# Patient Record
Sex: MALE | Race: WHITE | NOT HISPANIC OR LATINO | Employment: FULL TIME | URBAN - METROPOLITAN AREA
[De-identification: names, ages, dates, MRNs, and addresses within clinical notes are randomized per-mention and may not be internally consistent; named-entity substitution may affect disease eponyms.]

---

## 2017-01-25 ENCOUNTER — OFFICE VISIT - HEALTHEAST (OUTPATIENT)
Dept: CARDIOLOGY | Facility: CLINIC | Age: 57
End: 2017-01-25

## 2017-01-25 DIAGNOSIS — I48.0 PAROXYSMAL ATRIAL FIBRILLATION (H): ICD-10-CM

## 2017-01-25 DIAGNOSIS — G47.33 OSA ON CPAP: ICD-10-CM

## 2017-01-25 ASSESSMENT — MIFFLIN-ST. JEOR: SCORE: 1801.91

## 2017-01-27 ENCOUNTER — AMBULATORY - HEALTHEAST (OUTPATIENT)
Dept: CARDIOLOGY | Facility: CLINIC | Age: 57
End: 2017-01-27

## 2017-01-27 DIAGNOSIS — I48.0 PAROXYSMAL ATRIAL FIBRILLATION (H): ICD-10-CM

## 2017-01-27 LAB
ATRIAL RATE - MUSE: 77 BPM
DIASTOLIC BLOOD PRESSURE - MUSE: NORMAL MMHG
INTERPRETATION ECG - MUSE: NORMAL
P AXIS - MUSE: 61 DEGREES
PR INTERVAL - MUSE: 114 MS
QRS DURATION - MUSE: 80 MS
QT - MUSE: 414 MS
QTC - MUSE: 468 MS
R AXIS - MUSE: 85 DEGREES
SYSTOLIC BLOOD PRESSURE - MUSE: NORMAL MMHG
T AXIS - MUSE: 57 DEGREES
VENTRICULAR RATE- MUSE: 77 BPM

## 2017-01-27 ASSESSMENT — MIFFLIN-ST. JEOR: SCORE: 1795.56

## 2017-01-30 ENCOUNTER — COMMUNICATION - HEALTHEAST (OUTPATIENT)
Dept: SLEEP MEDICINE | Facility: CLINIC | Age: 57
End: 2017-01-30

## 2017-01-30 DIAGNOSIS — G47.00 INSOMNIA: ICD-10-CM

## 2017-01-31 ENCOUNTER — AMBULATORY - HEALTHEAST (OUTPATIENT)
Dept: CARDIOLOGY | Facility: CLINIC | Age: 57
End: 2017-01-31

## 2017-01-31 DIAGNOSIS — I48.0 PAROXYSMAL ATRIAL FIBRILLATION (H): ICD-10-CM

## 2017-01-31 LAB
ATRIAL RATE - MUSE: 52 BPM
DIASTOLIC BLOOD PRESSURE - MUSE: NORMAL MMHG
INTERPRETATION ECG - MUSE: NORMAL
P AXIS - MUSE: 45 DEGREES
PR INTERVAL - MUSE: 162 MS
QRS DURATION - MUSE: 84 MS
QT - MUSE: 454 MS
QTC - MUSE: 422 MS
R AXIS - MUSE: 81 DEGREES
SYSTOLIC BLOOD PRESSURE - MUSE: NORMAL MMHG
T AXIS - MUSE: 46 DEGREES
VENTRICULAR RATE- MUSE: 52 BPM

## 2017-01-31 ASSESSMENT — MIFFLIN-ST. JEOR: SCORE: 1813.7

## 2017-02-03 ENCOUNTER — AMBULATORY - HEALTHEAST (OUTPATIENT)
Dept: CARDIOLOGY | Facility: CLINIC | Age: 57
End: 2017-02-03

## 2017-02-03 ENCOUNTER — SURGERY - HEALTHEAST (OUTPATIENT)
Dept: CARDIOLOGY | Facility: CLINIC | Age: 57
End: 2017-02-03

## 2017-02-03 ENCOUNTER — COMMUNICATION - HEALTHEAST (OUTPATIENT)
Dept: CARDIOLOGY | Facility: CLINIC | Age: 57
End: 2017-02-03

## 2017-02-03 DIAGNOSIS — I48.0 PAROXYSMAL ATRIAL FIBRILLATION (H): ICD-10-CM

## 2017-02-20 ENCOUNTER — HOSPITAL ENCOUNTER (OUTPATIENT)
Dept: MRI IMAGING | Facility: HOSPITAL | Age: 57
Discharge: HOME | End: 2017-02-20
Attending: INTERNAL MEDICINE

## 2017-02-20 DIAGNOSIS — I48.0 PAROXYSMAL ATRIAL FIBRILLATION (H): ICD-10-CM

## 2017-03-06 ENCOUNTER — COMMUNICATION - HEALTHEAST (OUTPATIENT)
Dept: INTERNAL MEDICINE | Facility: CLINIC | Age: 57
End: 2017-03-06

## 2017-03-06 DIAGNOSIS — E78.00 PURE HYPERCHOLESTEROLEMIA: ICD-10-CM

## 2017-03-08 ENCOUNTER — RECORDS - HEALTHEAST (OUTPATIENT)
Dept: ADMINISTRATIVE | Facility: OTHER | Age: 57
End: 2017-03-08

## 2017-03-22 ENCOUNTER — OFFICE VISIT - HEALTHEAST (OUTPATIENT)
Dept: CARDIOLOGY | Facility: CLINIC | Age: 57
End: 2017-03-22

## 2017-03-22 ENCOUNTER — COMMUNICATION - HEALTHEAST (OUTPATIENT)
Dept: CARDIOLOGY | Facility: CLINIC | Age: 57
End: 2017-03-22

## 2017-03-22 ENCOUNTER — AMBULATORY - HEALTHEAST (OUTPATIENT)
Dept: CARDIOLOGY | Facility: CLINIC | Age: 57
End: 2017-03-22

## 2017-03-22 ENCOUNTER — SURGERY - HEALTHEAST (OUTPATIENT)
Dept: CARDIOLOGY | Facility: CLINIC | Age: 57
End: 2017-03-22

## 2017-03-22 DIAGNOSIS — G47.33 OSA ON CPAP: ICD-10-CM

## 2017-03-22 DIAGNOSIS — I48.0 PAROXYSMAL ATRIAL FIBRILLATION (H): ICD-10-CM

## 2017-03-22 DIAGNOSIS — I48.19 PERSISTENT ATRIAL FIBRILLATION (H): ICD-10-CM

## 2017-03-22 LAB
ATRIAL RATE - MUSE: 267 BPM
DIASTOLIC BLOOD PRESSURE - MUSE: NORMAL MMHG
INTERPRETATION ECG - MUSE: NORMAL
P AXIS - MUSE: NORMAL DEGREES
PR INTERVAL - MUSE: NORMAL MS
QRS DURATION - MUSE: 80 MS
QT - MUSE: 400 MS
QTC - MUSE: 467 MS
R AXIS - MUSE: 76 DEGREES
SYSTOLIC BLOOD PRESSURE - MUSE: NORMAL MMHG
T AXIS - MUSE: 21 DEGREES
VENTRICULAR RATE- MUSE: 82 BPM

## 2017-03-22 ASSESSMENT — MIFFLIN-ST. JEOR: SCORE: 1791.02

## 2017-04-12 ENCOUNTER — AMBULATORY - HEALTHEAST (OUTPATIENT)
Dept: CARDIOLOGY | Facility: CLINIC | Age: 57
End: 2017-04-12

## 2017-04-12 DIAGNOSIS — I48.0 PAROXYSMAL ATRIAL FIBRILLATION (H): ICD-10-CM

## 2017-04-12 DIAGNOSIS — I48.19 PERSISTENT ATRIAL FIBRILLATION (H): ICD-10-CM

## 2017-04-18 ENCOUNTER — ANESTHESIA - HEALTHEAST (OUTPATIENT)
Dept: CARDIOLOGY | Facility: CLINIC | Age: 57
End: 2017-04-18

## 2017-04-18 ASSESSMENT — MIFFLIN-ST. JEOR: SCORE: 1786.48

## 2017-04-19 ENCOUNTER — SURGERY - HEALTHEAST (OUTPATIENT)
Dept: CARDIOLOGY | Facility: CLINIC | Age: 57
End: 2017-04-19

## 2017-04-19 ASSESSMENT — MIFFLIN-ST. JEOR
SCORE: 1802.36
SCORE: 1837.74

## 2017-04-20 ASSESSMENT — MIFFLIN-ST. JEOR: SCORE: 1848.18

## 2017-04-21 ASSESSMENT — MIFFLIN-ST. JEOR: SCORE: 1841.37

## 2017-04-24 ENCOUNTER — AMBULATORY - HEALTHEAST (OUTPATIENT)
Dept: CARDIOLOGY | Facility: CLINIC | Age: 57
End: 2017-04-24

## 2017-04-24 DIAGNOSIS — Z98.890 STATUS POST CATHETER ABLATION OF ATRIAL FIBRILLATION: ICD-10-CM

## 2017-04-24 ASSESSMENT — MIFFLIN-ST. JEOR: SCORE: 1814.61

## 2017-05-08 ENCOUNTER — COMMUNICATION - HEALTHEAST (OUTPATIENT)
Dept: CARDIOLOGY | Facility: CLINIC | Age: 57
End: 2017-05-08

## 2017-05-08 ENCOUNTER — COMMUNICATION - HEALTHEAST (OUTPATIENT)
Dept: INTERNAL MEDICINE | Facility: CLINIC | Age: 57
End: 2017-05-08

## 2017-05-08 ENCOUNTER — AMBULATORY - HEALTHEAST (OUTPATIENT)
Dept: CARDIOLOGY | Facility: CLINIC | Age: 57
End: 2017-05-08

## 2017-05-08 DIAGNOSIS — I48.0 PAROXYSMAL ATRIAL FIBRILLATION (H): ICD-10-CM

## 2017-05-08 LAB
ATRIAL RATE - MUSE: 300 BPM
DIASTOLIC BLOOD PRESSURE - MUSE: NORMAL MMHG
INTERPRETATION ECG - MUSE: NORMAL
P AXIS - MUSE: 71 DEGREES
PR INTERVAL - MUSE: NORMAL MS
QRS DURATION - MUSE: 72 MS
QT - MUSE: 360 MS
QTC - MUSE: 412 MS
R AXIS - MUSE: 82 DEGREES
SYSTOLIC BLOOD PRESSURE - MUSE: NORMAL MMHG
T AXIS - MUSE: 22 DEGREES
VENTRICULAR RATE- MUSE: 79 BPM

## 2017-05-09 ENCOUNTER — AMBULATORY - HEALTHEAST (OUTPATIENT)
Dept: CARDIOLOGY | Facility: CLINIC | Age: 57
End: 2017-05-09

## 2017-05-09 ENCOUNTER — SURGERY - HEALTHEAST (OUTPATIENT)
Dept: CARDIOLOGY | Facility: CLINIC | Age: 57
End: 2017-05-09

## 2017-05-09 DIAGNOSIS — I48.92 ATRIAL FLUTTER, UNSPECIFIED TYPE (H): ICD-10-CM

## 2017-05-10 ENCOUNTER — AMBULATORY - HEALTHEAST (OUTPATIENT)
Dept: CARDIOLOGY | Facility: CLINIC | Age: 57
End: 2017-05-10

## 2017-05-10 DIAGNOSIS — I48.92 ATRIAL FLUTTER, UNSPECIFIED TYPE (H): ICD-10-CM

## 2017-05-12 ENCOUNTER — COMMUNICATION - HEALTHEAST (OUTPATIENT)
Dept: CARDIOLOGY | Facility: CLINIC | Age: 57
End: 2017-05-12

## 2017-05-17 ENCOUNTER — COMMUNICATION - HEALTHEAST (OUTPATIENT)
Dept: CARDIOLOGY | Facility: CLINIC | Age: 57
End: 2017-05-17

## 2017-05-17 ENCOUNTER — AMBULATORY - HEALTHEAST (OUTPATIENT)
Dept: CARDIOLOGY | Facility: CLINIC | Age: 57
End: 2017-05-17

## 2017-05-17 DIAGNOSIS — I48.0 PAROXYSMAL ATRIAL FIBRILLATION (H): ICD-10-CM

## 2017-05-18 ENCOUNTER — HOSPITAL ENCOUNTER (OUTPATIENT)
Dept: CARDIOLOGY | Facility: CLINIC | Age: 57
Discharge: HOME | End: 2017-05-18
Attending: INTERNAL MEDICINE | Admitting: INTERNAL MEDICINE

## 2017-05-18 ENCOUNTER — ANESTHESIA - HEALTHEAST (OUTPATIENT)
Dept: CARDIOLOGY | Facility: CLINIC | Age: 57
End: 2017-05-18

## 2017-05-18 DIAGNOSIS — I48.92 ATRIAL FLUTTER, UNSPECIFIED TYPE (H): ICD-10-CM

## 2017-05-18 LAB
ATRIAL RATE - MUSE: 288 BPM
ATRIAL RATE - MUSE: 52 BPM
DIASTOLIC BLOOD PRESSURE - MUSE: NORMAL MMHG
DIASTOLIC BLOOD PRESSURE - MUSE: NORMAL MMHG
INTERPRETATION ECG - MUSE: NORMAL
INTERPRETATION ECG - MUSE: NORMAL
P AXIS - MUSE: 69 DEGREES
P AXIS - MUSE: NORMAL DEGREES
PR INTERVAL - MUSE: 194 MS
PR INTERVAL - MUSE: NORMAL MS
QRS DURATION - MUSE: 68 MS
QRS DURATION - MUSE: 80 MS
QT - MUSE: 346 MS
QT - MUSE: 460 MS
QTC - MUSE: 427 MS
QTC - MUSE: 453 MS
R AXIS - MUSE: 75 DEGREES
R AXIS - MUSE: 77 DEGREES
SYSTOLIC BLOOD PRESSURE - MUSE: NORMAL MMHG
SYSTOLIC BLOOD PRESSURE - MUSE: NORMAL MMHG
T AXIS - MUSE: 38 DEGREES
T AXIS - MUSE: 59 DEGREES
VENTRICULAR RATE- MUSE: 103 BPM
VENTRICULAR RATE- MUSE: 52 BPM

## 2017-05-18 ASSESSMENT — MIFFLIN-ST. JEOR: SCORE: 1767.13

## 2017-05-19 ENCOUNTER — COMMUNICATION - HEALTHEAST (OUTPATIENT)
Dept: CARDIOLOGY | Facility: CLINIC | Age: 57
End: 2017-05-19

## 2017-05-31 ENCOUNTER — OFFICE VISIT - HEALTHEAST (OUTPATIENT)
Dept: CARDIOLOGY | Facility: CLINIC | Age: 57
End: 2017-05-31

## 2017-05-31 DIAGNOSIS — I48.0 PAROXYSMAL ATRIAL FIBRILLATION (H): ICD-10-CM

## 2017-05-31 DIAGNOSIS — G47.33 OSA ON CPAP: ICD-10-CM

## 2017-05-31 DIAGNOSIS — I48.3 TYPICAL ATRIAL FLUTTER (H): ICD-10-CM

## 2017-05-31 DIAGNOSIS — I48.4 ATYPICAL ATRIAL FLUTTER (H): ICD-10-CM

## 2017-05-31 ASSESSMENT — MIFFLIN-ST. JEOR: SCORE: 1768.8

## 2017-06-24 ENCOUNTER — COMMUNICATION - HEALTHEAST (OUTPATIENT)
Dept: CARDIOLOGY | Facility: CLINIC | Age: 57
End: 2017-06-24

## 2017-06-24 DIAGNOSIS — I48.0 PAROXYSMAL ATRIAL FIBRILLATION (H): ICD-10-CM

## 2017-07-13 ENCOUNTER — OFFICE VISIT - HEALTHEAST (OUTPATIENT)
Dept: CARDIOLOGY | Facility: CLINIC | Age: 57
End: 2017-07-13

## 2017-07-13 DIAGNOSIS — G47.33 OSA ON CPAP: ICD-10-CM

## 2017-07-13 DIAGNOSIS — I48.3 TYPICAL ATRIAL FLUTTER (H): ICD-10-CM

## 2017-07-13 DIAGNOSIS — I48.0 PAROXYSMAL ATRIAL FIBRILLATION (H): ICD-10-CM

## 2017-07-13 DIAGNOSIS — Z98.890 STATUS POST CATHETER ABLATION OF ATRIAL FIBRILLATION: ICD-10-CM

## 2017-07-13 DIAGNOSIS — I48.4 ATYPICAL ATRIAL FLUTTER (H): ICD-10-CM

## 2017-07-13 ASSESSMENT — MIFFLIN-ST. JEOR: SCORE: 1770.61

## 2017-07-27 ENCOUNTER — COMMUNICATION - HEALTHEAST (OUTPATIENT)
Dept: SLEEP MEDICINE | Facility: CLINIC | Age: 57
End: 2017-07-27

## 2017-07-27 DIAGNOSIS — G47.00 INSOMNIA: ICD-10-CM

## 2017-07-31 ENCOUNTER — COMMUNICATION - HEALTHEAST (OUTPATIENT)
Dept: SLEEP MEDICINE | Facility: CLINIC | Age: 57
End: 2017-07-31

## 2017-07-31 ENCOUNTER — HOSPITAL ENCOUNTER (OUTPATIENT)
Dept: CARDIOLOGY | Facility: CLINIC | Age: 57
Discharge: HOME | End: 2017-07-31
Attending: INTERNAL MEDICINE

## 2017-07-31 DIAGNOSIS — G47.00 INSOMNIA: ICD-10-CM

## 2017-07-31 DIAGNOSIS — I48.0 PAROXYSMAL ATRIAL FIBRILLATION (H): ICD-10-CM

## 2017-08-08 ENCOUNTER — OFFICE VISIT - HEALTHEAST (OUTPATIENT)
Dept: SLEEP MEDICINE | Facility: CLINIC | Age: 57
End: 2017-08-08

## 2017-08-08 DIAGNOSIS — G47.10 HYPERSOMNIA, UNSPECIFIED: ICD-10-CM

## 2017-08-08 DIAGNOSIS — G47.8 SLEEP DYSFUNCTION WITH SLEEP STAGE DISTURBANCE: ICD-10-CM

## 2017-08-08 DIAGNOSIS — G47.00 PERSISTENT INSOMNIA: ICD-10-CM

## 2017-08-08 DIAGNOSIS — F51.01 PRIMARY INSOMNIA: ICD-10-CM

## 2017-08-08 DIAGNOSIS — G47.33 OSA ON CPAP: ICD-10-CM

## 2017-08-08 ASSESSMENT — MIFFLIN-ST. JEOR: SCORE: 1775.15

## 2017-09-08 ENCOUNTER — RECORDS - HEALTHEAST (OUTPATIENT)
Dept: ADMINISTRATIVE | Facility: OTHER | Age: 57
End: 2017-09-08

## 2017-10-18 ENCOUNTER — OFFICE VISIT - HEALTHEAST (OUTPATIENT)
Dept: CARDIOLOGY | Facility: CLINIC | Age: 57
End: 2017-10-18

## 2017-10-18 DIAGNOSIS — I48.4 ATYPICAL ATRIAL FLUTTER (H): ICD-10-CM

## 2017-10-18 DIAGNOSIS — I48.3 TYPICAL ATRIAL FLUTTER (H): ICD-10-CM

## 2017-10-18 DIAGNOSIS — G47.33 OSA ON CPAP: ICD-10-CM

## 2017-10-18 DIAGNOSIS — I48.0 PAROXYSMAL ATRIAL FIBRILLATION (H): ICD-10-CM

## 2017-10-18 ASSESSMENT — MIFFLIN-ST. JEOR: SCORE: 1788.76

## 2017-12-18 ENCOUNTER — COMMUNICATION - HEALTHEAST (OUTPATIENT)
Dept: SLEEP MEDICINE | Facility: CLINIC | Age: 57
End: 2017-12-18

## 2017-12-18 ENCOUNTER — COMMUNICATION - HEALTHEAST (OUTPATIENT)
Dept: INTERNAL MEDICINE | Facility: CLINIC | Age: 57
End: 2017-12-18

## 2017-12-18 DIAGNOSIS — F51.01 PRIMARY INSOMNIA: ICD-10-CM

## 2017-12-18 DIAGNOSIS — E78.00 PURE HYPERCHOLESTEROLEMIA: ICD-10-CM

## 2018-02-19 ENCOUNTER — COMMUNICATION - HEALTHEAST (OUTPATIENT)
Dept: INTERNAL MEDICINE | Facility: CLINIC | Age: 58
End: 2018-02-19

## 2018-03-06 ENCOUNTER — RECORDS - HEALTHEAST (OUTPATIENT)
Dept: ADMINISTRATIVE | Facility: OTHER | Age: 58
End: 2018-03-06

## 2018-03-19 ENCOUNTER — OFFICE VISIT - HEALTHEAST (OUTPATIENT)
Dept: INTERNAL MEDICINE | Facility: CLINIC | Age: 58
End: 2018-03-19

## 2018-03-19 DIAGNOSIS — I48.0 PAROXYSMAL ATRIAL FIBRILLATION (H): ICD-10-CM

## 2018-03-19 DIAGNOSIS — G47.33 OSA ON CPAP: ICD-10-CM

## 2018-03-19 DIAGNOSIS — H61.22 IMPACTED CERUMEN OF LEFT EAR: ICD-10-CM

## 2018-03-19 DIAGNOSIS — C85.80 MARGINAL ZONE LYMPHOMA (H): ICD-10-CM

## 2018-03-19 DIAGNOSIS — R45.1 RESTLESSNESS: ICD-10-CM

## 2018-03-19 DIAGNOSIS — H91.92 HEARING REDUCED, LEFT: ICD-10-CM

## 2018-03-19 DIAGNOSIS — Z87.19 HISTORY OF COLITIS: ICD-10-CM

## 2018-03-19 DIAGNOSIS — Z51.81 MEDICATION MONITORING ENCOUNTER: ICD-10-CM

## 2018-03-19 DIAGNOSIS — E78.00 PURE HYPERCHOLESTEROLEMIA: ICD-10-CM

## 2018-03-19 ASSESSMENT — MIFFLIN-ST. JEOR: SCORE: 1820.51

## 2018-04-09 ENCOUNTER — OFFICE VISIT - HEALTHEAST (OUTPATIENT)
Dept: OTOLARYNGOLOGY | Facility: CLINIC | Age: 58
End: 2018-04-09

## 2018-04-09 DIAGNOSIS — H61.22 IMPACTED CERUMEN OF LEFT EAR: ICD-10-CM

## 2018-04-09 DIAGNOSIS — H91.92 HEARING REDUCED, LEFT: ICD-10-CM

## 2018-05-19 ENCOUNTER — COMMUNICATION - HEALTHEAST (OUTPATIENT)
Dept: SLEEP MEDICINE | Facility: CLINIC | Age: 58
End: 2018-05-19

## 2018-05-19 DIAGNOSIS — F51.01 PRIMARY INSOMNIA: ICD-10-CM

## 2018-05-21 ENCOUNTER — COMMUNICATION - HEALTHEAST (OUTPATIENT)
Dept: INTERNAL MEDICINE | Facility: CLINIC | Age: 58
End: 2018-05-21

## 2018-05-30 ENCOUNTER — OFFICE VISIT - HEALTHEAST (OUTPATIENT)
Dept: CARDIOLOGY | Facility: CLINIC | Age: 58
End: 2018-05-30

## 2018-05-30 DIAGNOSIS — I48.0 PAROXYSMAL ATRIAL FIBRILLATION (H): ICD-10-CM

## 2018-05-30 DIAGNOSIS — G47.33 OSA ON CPAP: ICD-10-CM

## 2018-05-30 DIAGNOSIS — I48.4 ATYPICAL ATRIAL FLUTTER (H): ICD-10-CM

## 2018-05-30 DIAGNOSIS — I48.3 TYPICAL ATRIAL FLUTTER (H): ICD-10-CM

## 2018-05-30 ASSESSMENT — MIFFLIN-ST. JEOR: SCORE: 1815.97

## 2018-08-13 ENCOUNTER — COMMUNICATION - HEALTHEAST (OUTPATIENT)
Dept: SCHEDULING | Facility: CLINIC | Age: 58
End: 2018-08-13

## 2018-08-14 ENCOUNTER — OFFICE VISIT - HEALTHEAST (OUTPATIENT)
Dept: INTERNAL MEDICINE | Facility: CLINIC | Age: 58
End: 2018-08-14

## 2018-08-14 DIAGNOSIS — K52.9 COLITIS: ICD-10-CM

## 2018-08-14 DIAGNOSIS — R10.13 ABDOMINAL PAIN, EPIGASTRIC: ICD-10-CM

## 2018-08-14 DIAGNOSIS — C85.80 MARGINAL ZONE LYMPHOMA (H): ICD-10-CM

## 2018-08-14 LAB
ALBUMIN SERPL-MCNC: 3.8 G/DL (ref 3.5–5)
ALBUMIN UR-MCNC: NEGATIVE MG/DL
ALP SERPL-CCNC: 69 U/L (ref 45–120)
ALT SERPL W P-5'-P-CCNC: 29 U/L (ref 0–45)
ANION GAP SERPL CALCULATED.3IONS-SCNC: 10 MMOL/L (ref 5–18)
APPEARANCE UR: CLEAR
AST SERPL W P-5'-P-CCNC: 21 U/L (ref 0–40)
BACTERIA #/AREA URNS HPF: ABNORMAL HPF
BILIRUB SERPL-MCNC: 0.5 MG/DL (ref 0–1)
BILIRUB UR QL STRIP: NEGATIVE
BUN SERPL-MCNC: 20 MG/DL (ref 8–22)
CALCIUM SERPL-MCNC: 9.4 MG/DL (ref 8.5–10.5)
CHLORIDE BLD-SCNC: 103 MMOL/L (ref 98–107)
CO2 SERPL-SCNC: 27 MMOL/L (ref 22–31)
COLOR UR AUTO: YELLOW
CREAT SERPL-MCNC: 0.85 MG/DL (ref 0.7–1.3)
ERYTHROCYTE [DISTWIDTH] IN BLOOD BY AUTOMATED COUNT: 12.3 % (ref 11–14.5)
GFR SERPL CREATININE-BSD FRML MDRD: >60 ML/MIN/1.73M2
GLUCOSE BLD-MCNC: 87 MG/DL (ref 70–125)
GLUCOSE UR STRIP-MCNC: NEGATIVE MG/DL
HCT VFR BLD AUTO: 47.2 % (ref 40–54)
HGB BLD-MCNC: 15.8 G/DL (ref 14–18)
HGB UR QL STRIP: ABNORMAL
KETONES UR STRIP-MCNC: NEGATIVE MG/DL
LEUKOCYTE ESTERASE UR QL STRIP: NEGATIVE
LIPASE SERPL-CCNC: 23 U/L (ref 0–52)
MCH RBC QN AUTO: 32.1 PG (ref 27–34)
MCHC RBC AUTO-ENTMCNC: 33.4 G/DL (ref 32–36)
MCV RBC AUTO: 96 FL (ref 80–100)
MUCOUS THREADS #/AREA URNS LPF: ABNORMAL LPF
NITRATE UR QL: NEGATIVE
PH UR STRIP: 6 [PH] (ref 5–8)
PLATELET # BLD AUTO: 237 THOU/UL (ref 140–440)
PMV BLD AUTO: 8.3 FL (ref 7–10)
POTASSIUM BLD-SCNC: 3.9 MMOL/L (ref 3.5–5)
PROT SERPL-MCNC: 7 G/DL (ref 6–8)
RBC # BLD AUTO: 4.92 MILL/UL (ref 4.4–6.2)
RBC #/AREA URNS AUTO: ABNORMAL HPF
SODIUM SERPL-SCNC: 140 MMOL/L (ref 136–145)
SP GR UR STRIP: 1.02 (ref 1–1.03)
SQUAMOUS #/AREA URNS AUTO: ABNORMAL LPF
UROBILINOGEN UR STRIP-ACNC: ABNORMAL
WBC #/AREA URNS AUTO: ABNORMAL HPF
WBC: 14.2 THOU/UL (ref 4–11)

## 2018-08-14 ASSESSMENT — MIFFLIN-ST. JEOR: SCORE: 1815.97

## 2018-08-15 ENCOUNTER — COMMUNICATION - HEALTHEAST (OUTPATIENT)
Dept: INTERNAL MEDICINE | Facility: CLINIC | Age: 58
End: 2018-08-15

## 2018-12-10 ENCOUNTER — OFFICE VISIT - HEALTHEAST (OUTPATIENT)
Dept: INTERNAL MEDICINE | Facility: CLINIC | Age: 58
End: 2018-12-10

## 2018-12-10 DIAGNOSIS — Z00.00 HEALTHCARE MAINTENANCE: ICD-10-CM

## 2018-12-10 DIAGNOSIS — E78.00 PURE HYPERCHOLESTEROLEMIA: ICD-10-CM

## 2018-12-10 DIAGNOSIS — C85.80 MARGINAL ZONE LYMPHOMA (H): ICD-10-CM

## 2018-12-10 DIAGNOSIS — I48.0 PAROXYSMAL ATRIAL FIBRILLATION (H): ICD-10-CM

## 2018-12-10 DIAGNOSIS — Z87.19 HISTORY OF COLITIS: ICD-10-CM

## 2018-12-10 DIAGNOSIS — G47.33 OSA ON CPAP: ICD-10-CM

## 2018-12-10 DIAGNOSIS — Z12.5 SCREENING FOR PROSTATE CANCER: ICD-10-CM

## 2018-12-10 DIAGNOSIS — Z51.81 MEDICATION MONITORING ENCOUNTER: ICD-10-CM

## 2018-12-10 LAB
ALBUMIN SERPL-MCNC: 4.1 G/DL (ref 3.5–5)
ALP SERPL-CCNC: 72 U/L (ref 45–120)
ALT SERPL W P-5'-P-CCNC: 33 U/L (ref 0–45)
ANION GAP SERPL CALCULATED.3IONS-SCNC: 11 MMOL/L (ref 5–18)
AST SERPL W P-5'-P-CCNC: 28 U/L (ref 0–40)
BILIRUB SERPL-MCNC: 0.4 MG/DL (ref 0–1)
BUN SERPL-MCNC: 22 MG/DL (ref 8–22)
CALCIUM SERPL-MCNC: 9.6 MG/DL (ref 8.5–10.5)
CHLORIDE BLD-SCNC: 102 MMOL/L (ref 98–107)
CO2 SERPL-SCNC: 29 MMOL/L (ref 22–31)
CREAT SERPL-MCNC: 0.87 MG/DL (ref 0.7–1.3)
FASTING STATUS PATIENT QL REPORTED: YES
GFR SERPL CREATININE-BSD FRML MDRD: >60 ML/MIN/1.73M2
GLUCOSE BLD-MCNC: 78 MG/DL (ref 70–125)
HDLC SERPL-MCNC: 41 MG/DL
LDLC SERPL CALC-MCNC: 96 MG/DL
POTASSIUM BLD-SCNC: 4.3 MMOL/L (ref 3.5–5)
PROT SERPL-MCNC: 7.2 G/DL (ref 6–8)
PSA SERPL-MCNC: 1.4 NG/ML (ref 0–3.5)
SODIUM SERPL-SCNC: 142 MMOL/L (ref 136–145)

## 2018-12-10 ASSESSMENT — MIFFLIN-ST. JEOR: SCORE: 1810.98

## 2018-12-11 ENCOUNTER — COMMUNICATION - HEALTHEAST (OUTPATIENT)
Dept: INTERNAL MEDICINE | Facility: CLINIC | Age: 58
End: 2018-12-11

## 2019-01-28 ENCOUNTER — COMMUNICATION - HEALTHEAST (OUTPATIENT)
Dept: SLEEP MEDICINE | Facility: CLINIC | Age: 59
End: 2019-01-28

## 2019-01-28 DIAGNOSIS — G47.00 PERSISTENT INSOMNIA: ICD-10-CM

## 2019-01-28 DIAGNOSIS — F51.01 PRIMARY INSOMNIA: ICD-10-CM

## 2019-03-19 ENCOUNTER — RECORDS - HEALTHEAST (OUTPATIENT)
Dept: ADMINISTRATIVE | Facility: OTHER | Age: 59
End: 2019-03-19

## 2019-04-02 ENCOUNTER — OFFICE VISIT - HEALTHEAST (OUTPATIENT)
Dept: SLEEP MEDICINE | Facility: CLINIC | Age: 59
End: 2019-04-02

## 2019-04-02 DIAGNOSIS — G47.33 OBSTRUCTIVE SLEEP APNEA: ICD-10-CM

## 2019-04-02 DIAGNOSIS — F51.01 PRIMARY INSOMNIA: ICD-10-CM

## 2019-04-02 DIAGNOSIS — G47.8 SLEEP DYSFUNCTION WITH SLEEP STAGE DISTURBANCE: ICD-10-CM

## 2019-04-02 ASSESSMENT — MIFFLIN-ST. JEOR: SCORE: 1815.97

## 2019-04-04 ENCOUNTER — RECORDS - HEALTHEAST (OUTPATIENT)
Dept: ADMINISTRATIVE | Facility: OTHER | Age: 59
End: 2019-04-04

## 2019-04-04 ENCOUNTER — COMMUNICATION - HEALTHEAST (OUTPATIENT)
Dept: INTERNAL MEDICINE | Facility: CLINIC | Age: 59
End: 2019-04-04

## 2019-04-04 DIAGNOSIS — E78.00 PURE HYPERCHOLESTEROLEMIA: ICD-10-CM

## 2019-04-04 DIAGNOSIS — Z87.19 HISTORY OF COLITIS: ICD-10-CM

## 2019-06-22 ENCOUNTER — COMMUNICATION - HEALTHEAST (OUTPATIENT)
Dept: INTERNAL MEDICINE | Facility: CLINIC | Age: 59
End: 2019-06-22

## 2019-06-22 DIAGNOSIS — E78.00 PURE HYPERCHOLESTEROLEMIA: ICD-10-CM

## 2019-06-25 ENCOUNTER — COMMUNICATION - HEALTHEAST (OUTPATIENT)
Dept: SLEEP MEDICINE | Facility: CLINIC | Age: 59
End: 2019-06-25

## 2019-06-25 DIAGNOSIS — G47.10 HYPERSOMNIA, UNSPECIFIED: ICD-10-CM

## 2019-06-25 DIAGNOSIS — G47.33 OBSTRUCTIVE SLEEP APNEA: ICD-10-CM

## 2019-08-19 ENCOUNTER — COMMUNICATION - HEALTHEAST (OUTPATIENT)
Dept: SLEEP MEDICINE | Facility: CLINIC | Age: 59
End: 2019-08-19

## 2019-08-19 DIAGNOSIS — F51.01 PRIMARY INSOMNIA: ICD-10-CM

## 2019-08-21 ENCOUNTER — COMMUNICATION - HEALTHEAST (OUTPATIENT)
Dept: INTERNAL MEDICINE | Facility: CLINIC | Age: 59
End: 2019-08-21

## 2019-09-10 ENCOUNTER — COMMUNICATION - HEALTHEAST (OUTPATIENT)
Dept: SLEEP MEDICINE | Facility: CLINIC | Age: 59
End: 2019-09-10

## 2019-09-26 ENCOUNTER — COMMUNICATION - HEALTHEAST (OUTPATIENT)
Dept: INTERNAL MEDICINE | Facility: CLINIC | Age: 59
End: 2019-09-26

## 2019-09-26 DIAGNOSIS — Z87.19 HISTORY OF COLITIS: ICD-10-CM

## 2019-10-10 ENCOUNTER — COMMUNICATION - HEALTHEAST (OUTPATIENT)
Dept: CARDIOLOGY | Facility: CLINIC | Age: 59
End: 2019-10-10

## 2019-10-14 ENCOUNTER — AMBULATORY - HEALTHEAST (OUTPATIENT)
Dept: CARDIOLOGY | Facility: CLINIC | Age: 59
End: 2019-10-14

## 2019-10-14 DIAGNOSIS — I48.0 PAROXYSMAL ATRIAL FIBRILLATION (H): ICD-10-CM

## 2019-10-14 DIAGNOSIS — Z00.6 EXAM FOR CLINICAL TRIAL: ICD-10-CM

## 2019-10-14 DIAGNOSIS — I48.3 TYPICAL ATRIAL FLUTTER (H): ICD-10-CM

## 2019-10-14 DIAGNOSIS — I48.4 ATYPICAL ATRIAL FLUTTER (H): ICD-10-CM

## 2019-10-14 LAB
ATRIAL RATE - MUSE: 72 BPM
DIASTOLIC BLOOD PRESSURE - MUSE: NORMAL
INTERPRETATION ECG - MUSE: NORMAL
P AXIS - MUSE: 67 DEGREES
PR INTERVAL - MUSE: 190 MS
QRS DURATION - MUSE: 84 MS
QT - MUSE: 390 MS
QTC - MUSE: 427 MS
R AXIS - MUSE: 90 DEGREES
SYSTOLIC BLOOD PRESSURE - MUSE: NORMAL
T AXIS - MUSE: 57 DEGREES
VENTRICULAR RATE- MUSE: 72 BPM

## 2019-10-14 ASSESSMENT — MIFFLIN-ST. JEOR: SCORE: 1809.15

## 2019-10-17 ENCOUNTER — AMBULATORY - HEALTHEAST (OUTPATIENT)
Dept: CARDIOLOGY | Facility: CLINIC | Age: 59
End: 2019-10-17

## 2019-12-04 ENCOUNTER — OFFICE VISIT - HEALTHEAST (OUTPATIENT)
Dept: CARDIOLOGY | Facility: CLINIC | Age: 59
End: 2019-12-04

## 2019-12-04 DIAGNOSIS — I48.3 TYPICAL ATRIAL FLUTTER (H): ICD-10-CM

## 2019-12-04 DIAGNOSIS — Z98.890 STATUS POST CATHETER ABLATION OF ATRIAL FIBRILLATION: ICD-10-CM

## 2019-12-04 DIAGNOSIS — I48.0 PAROXYSMAL ATRIAL FIBRILLATION (H): ICD-10-CM

## 2019-12-04 ASSESSMENT — MIFFLIN-ST. JEOR: SCORE: 1793.29

## 2020-02-07 ENCOUNTER — COMMUNICATION - HEALTHEAST (OUTPATIENT)
Dept: INTERNAL MEDICINE | Facility: CLINIC | Age: 60
End: 2020-02-07

## 2020-02-07 DIAGNOSIS — E78.00 PURE HYPERCHOLESTEROLEMIA: ICD-10-CM

## 2020-02-07 DIAGNOSIS — Z87.19 HISTORY OF COLITIS: ICD-10-CM

## 2020-02-25 ENCOUNTER — RECORDS - HEALTHEAST (OUTPATIENT)
Dept: ADMINISTRATIVE | Facility: OTHER | Age: 60
End: 2020-02-25

## 2020-05-26 ENCOUNTER — COMMUNICATION - HEALTHEAST (OUTPATIENT)
Dept: SLEEP MEDICINE | Facility: CLINIC | Age: 60
End: 2020-05-26

## 2020-05-26 DIAGNOSIS — F51.01 PRIMARY INSOMNIA: ICD-10-CM

## 2020-05-29 ENCOUNTER — MEDICAL CORRESPONDENCE (OUTPATIENT)
Dept: HEALTH INFORMATION MANAGEMENT | Facility: CLINIC | Age: 60
End: 2020-05-29

## 2020-07-01 ENCOUNTER — OFFICE VISIT - HEALTHEAST (OUTPATIENT)
Dept: OTOLARYNGOLOGY | Facility: CLINIC | Age: 60
End: 2020-07-01

## 2020-07-01 DIAGNOSIS — H61.23 BILATERAL IMPACTED CERUMEN: ICD-10-CM

## 2020-07-02 ENCOUNTER — OFFICE VISIT - HEALTHEAST (OUTPATIENT)
Dept: INTERNAL MEDICINE | Facility: CLINIC | Age: 60
End: 2020-07-02

## 2020-07-02 ENCOUNTER — COMMUNICATION - HEALTHEAST (OUTPATIENT)
Dept: SCHEDULING | Facility: CLINIC | Age: 60
End: 2020-07-02

## 2020-07-02 ENCOUNTER — COMMUNICATION - HEALTHEAST (OUTPATIENT)
Dept: INTERNAL MEDICINE | Facility: CLINIC | Age: 60
End: 2020-07-02

## 2020-07-02 DIAGNOSIS — Z98.890 STATUS POST CATHETER ABLATION OF ATRIAL FIBRILLATION: ICD-10-CM

## 2020-07-02 DIAGNOSIS — M54.12 CERVICAL RADICULOPATHY: ICD-10-CM

## 2020-07-02 DIAGNOSIS — I48.0 PAROXYSMAL ATRIAL FIBRILLATION (H): ICD-10-CM

## 2020-12-14 ENCOUNTER — COMMUNICATION - HEALTHEAST (OUTPATIENT)
Dept: SLEEP MEDICINE | Facility: CLINIC | Age: 60
End: 2020-12-14

## 2020-12-14 DIAGNOSIS — F51.01 PRIMARY INSOMNIA: ICD-10-CM

## 2020-12-14 RX ORDER — ZOLPIDEM TARTRATE 10 MG/1
10 TABLET ORAL
COMMUNITY
End: 2022-01-06

## 2020-12-14 RX ORDER — ZOLPIDEM TARTRATE 10 MG/1
10 TABLET ORAL
OUTPATIENT
Start: 2020-12-14

## 2021-02-09 ENCOUNTER — RECORDS - HEALTHEAST (OUTPATIENT)
Dept: ADMINISTRATIVE | Facility: OTHER | Age: 61
End: 2021-02-09

## 2021-02-17 ENCOUNTER — COMMUNICATION - HEALTHEAST (OUTPATIENT)
Dept: CARDIOLOGY | Facility: CLINIC | Age: 61
End: 2021-02-17

## 2021-02-18 ENCOUNTER — OFFICE VISIT - HEALTHEAST (OUTPATIENT)
Dept: CARDIOLOGY | Facility: CLINIC | Age: 61
End: 2021-02-18

## 2021-02-18 DIAGNOSIS — I48.4 ATYPICAL ATRIAL FLUTTER (H): ICD-10-CM

## 2021-02-18 DIAGNOSIS — I48.19 PERSISTENT ATRIAL FIBRILLATION (H): ICD-10-CM

## 2021-02-18 DIAGNOSIS — E78.00 PURE HYPERCHOLESTEROLEMIA: ICD-10-CM

## 2021-02-18 DIAGNOSIS — I48.3 TYPICAL ATRIAL FLUTTER (H): ICD-10-CM

## 2021-02-18 DIAGNOSIS — G47.33 OSA ON CPAP: ICD-10-CM

## 2021-02-18 ASSESSMENT — MIFFLIN-ST. JEOR: SCORE: 1711.65

## 2021-03-11 ENCOUNTER — COMMUNICATION - HEALTHEAST (OUTPATIENT)
Dept: INTERNAL MEDICINE | Facility: CLINIC | Age: 61
End: 2021-03-11

## 2021-03-11 DIAGNOSIS — Z87.19 HISTORY OF COLITIS: ICD-10-CM

## 2021-03-11 DIAGNOSIS — E78.00 PURE HYPERCHOLESTEROLEMIA: ICD-10-CM

## 2021-05-27 NOTE — TELEPHONE ENCOUNTER
Refill Approved    Rx renewed per Medication Renewal Policy. Medication was last renewed on 3/19/18.    Jelena Cobos, [facility] Triage/Med Refill 4/5/2019     Requested Prescriptions   Pending Prescriptions Disp Refills     simvastatin (ZOCOR) 20 MG tablet [Pharmacy Med Name: SIMVASTATIN 20MG TABLET** 20 TAB] 90 tablet 3     Sig: TAKE 1 TABLET BY MOUTH EACH NIGHT AT BEDTIME    Statins Refill Protocol (Hmg CoA Reductase Inhibitors) Passed - 4/4/2019  9:54 AM       Passed - PCP or prescribing provider visit in past 12 months     Last office visit with prescriber/PCP: 3/19/2018 Ryan Hernandez MD OR same dept: Visit date not found OR same specialty: 8/14/2018 Hossein Rosario MD  Last physical: 12/10/2018 Last MTM visit: Visit date not found   Next visit within 3 mo: Visit date not found  Next physical within 3 mo: Visit date not found  Prescriber OR PCP: Ryan Hernandez MD  Last diagnosis associated with med order: 1. Pure hypercholesterolemia  - simvastatin (ZOCOR) 20 MG tablet [Pharmacy Med Name: SIMVASTATIN 20MG TABLET** 20 TAB]; TAKE 1 TABLET BY MOUTH EACH NIGHT AT BEDTIME  Dispense: 90 tablet; Refill: 3    2. History of colitis  - DELZICOL 400 mg delayed-release capsule [Pharmacy Med Name: DELZICOL  MG CAPSULE 400 CAP]; TAKE 2 CAPSULES (800 MG TOTAL) BY MOUTH DAILY.  Dispense: 180 capsule; Refill: 3    If protocol passes may refill for 12 months if within 3 months of last provider visit (or a total of 15 months).             DELZICOL 400 mg delayed-release capsule [Pharmacy Med Name: DELZICOL  MG CAPSULE 400 CAP] 180 capsule 3     Sig: TAKE 2 CAPSULES (800 MG TOTAL) BY MOUTH DAILY.    Mesalamine Refill Protocol for Crohns Passed - 4/4/2019  9:54 AM       Passed - LFT or AST or ALT in last year    Albumin   Date Value Ref Range Status   12/10/2018 4.1 3.5 - 5.0 g/dL Final     Bilirubin, Total   Date Value Ref Range Status   12/10/2018 0.4 0.0 - 1.0 mg/dL Final     Alkaline Phosphatase   Date Value  Ref Range Status   12/10/2018 72 45 - 120 U/L Final     AST   Date Value Ref Range Status   12/10/2018 28 0 - 40 U/L Final     ALT   Date Value Ref Range Status   12/10/2018 33 0 - 45 U/L Final     Protein, Total   Date Value Ref Range Status   12/10/2018 7.2 6.0 - 8.0 g/dL Final               Passed - Visit with PCP or prescribing provider visit in last 6 months or next 3 months    Last office visit with prescriber/PCP: Visit date not found OR same dept: Visit date not found OR same specialty: 8/14/2018 Hossein Rosario MD Last physical: 12/10/2018 Last MTM visit: Visit date not found     Next appt within 3 mo: Visit date not found  Next physical within 3 mo: Visit date not found  Prescriber OR PCP: Ryan Hernandez MD  Last diagnosis associated with med order: 1. Pure hypercholesterolemia  - simvastatin (ZOCOR) 20 MG tablet [Pharmacy Med Name: SIMVASTATIN 20MG TABLET** 20 TAB]; TAKE 1 TABLET BY MOUTH EACH NIGHT AT BEDTIME  Dispense: 90 tablet; Refill: 3    2. History of colitis  - DELZICOL 400 mg delayed-release capsule [Pharmacy Med Name: DELZICOL  MG CAPSULE 400 CAP]; TAKE 2 CAPSULES (800 MG TOTAL) BY MOUTH DAILY.  Dispense: 180 capsule; Refill: 3    If protocol passes may refill for 6 months if within 3 months of last provider visit (or a total of 9 months).             Passed - CBC (Hemogram 2) in last year     WBC   Date Value Ref Range Status   08/14/2018 14.2 (H) 4.0 - 11.0 thou/uL Final     RBC   Date Value Ref Range Status   08/14/2018 4.92 4.40 - 6.20 mill/uL Final     Hemoglobin   Date Value Ref Range Status   08/14/2018 15.8 14.0 - 18.0 g/dL Final     Hematocrit   Date Value Ref Range Status   08/14/2018 47.2 40.0 - 54.0 % Final     MCV   Date Value Ref Range Status   08/14/2018 96 80 - 100 fL Final     MCH   Date Value Ref Range Status   08/14/2018 32.1 27.0 - 34.0 pg Final     MCHC   Date Value Ref Range Status   08/14/2018 33.4 32.0 - 36.0 g/dL Final     RDW   Date Value Ref Range Status    08/14/2018 12.3 11.0 - 14.5 % Final     Platelets   Date Value Ref Range Status   08/14/2018 237 140 - 440 thou/uL Final     MPV   Date Value Ref Range Status   08/14/2018 8.3 7.0 - 10.0 fL Final               Passed - Serum Creatinine in the last year    Creatinine   Date Value Ref Range Status   12/10/2018 0.87 0.70 - 1.30 mg/dL Final

## 2021-05-27 NOTE — PROGRESS NOTES
Dear Dr. Hernandez, Ryan MATTSON MD  [address] [place] [address],    Thank you for the opportunity to participate in the care of Rik Zarate.     He is a 58 y.o. y/o male patient who comes to the sleep medicine clinic for follow up.  The patient states that he still doing well on CPAP therapy.  He still occasionally requires the Ambien to help him initiate sleep.  He does complaint of frequent nocturnal awakenings but this may be secondary to the dog sleeping in the bedroom.  He has discussed this with his wife about removing the dog in the past.    Compliance Download data for 30 days:  Pressure setting: APAP 8-12 CWP  Residual AHI: 0.2 events per hour  Leak: Minimal  Compliance: 97%  Mask Tolerance: Good  Skin irritation: None      Past Medical History:   Diagnosis Date     Atrial fibrillation (H)      Cancer (H)     marginal zone non hodgkins lymphoma     Colitis      High cholesterol      JESSIE on CPAP        Past Surgical History:   Procedure Laterality Date     CARDIOVERSION  05/18/2017     OTHER SURGICAL HISTORY  04/19/2017    atrial fib ablation     MS EPHYS EVAL W/ABLATION SUPRAVENT ARRHYTHMIA  4/19/2017    Procedure: EP Ablation Atrial Flutter;  Surgeon: Jones Leonardo MD;  Location: [facility];  Service: Cardiology     MS EPHYS EVL TRNSPTL TX ATRIAL FIB ISOLAT PULM VEIN Left 4/19/2017    Procedure: EP Ablation PVI;  Surgeon: Jones Leonardo MD;  Location: [facility];  Service: Cardiology       Social History     Socioeconomic History     Marital status:      Spouse name: Not on file     Number of children: Not on file     Years of education: Not on file     Highest education level: Not on file   Occupational History     Occupation: manager   Social Needs     Financial resource strain: Not on file     Food insecurity:     Worry: Not on file     Inability: Not on file     Transportation needs:     Medical: Not on file     Non-medical: Not on file   Tobacco Use     Smoking  status: Former Smoker     Last attempt to quit: 1990     Years since quittin.2     Smokeless tobacco: Former User     Tobacco comment: occasional cigar smoker   Substance and Sexual Activity     Alcohol use: Yes     Alcohol/week: 2.4 oz     Types: 1 Cans of beer, 3 Shots of liquor per week     Comment: weekly     Drug use: No     Comment: Occasional use.     Sexual activity: Not on file   Lifestyle     Physical activity:     Days per week: Not on file     Minutes per session: Not on file     Stress: Not on file   Relationships     Social connections:     Talks on phone: Not on file     Gets together: Not on file     Attends Church service: Not on file     Active member of club or organization: Not on file     Attends meetings of clubs or organizations: Not on file     Relationship status: Not on file     Intimate partner violence:     Fear of current or ex partner: Not on file     Emotionally abused: Not on file     Physically abused: Not on file     Forced sexual activity: Not on file   Other Topics Concern     Not on file   Social History Narrative     Not on file       Current Outpatient Medications   Medication Sig Dispense Refill     aspirin 81 MG EC tablet Take 1 tablet (81 mg total) by mouth daily.       CALCIUM CITRATE/VITAMIN D3 (CITRACAL REGULAR ORAL) Take 1 tablet by mouth daily.       coQ10, ubiquinol, 100 mg cap Take 1 capsule by mouth daily.       loratadine (CLARITIN) 10 mg tablet Take 10 mg by mouth daily.       mesalamine (DELZICOL) 400 mg delayed-release capsule Take 2 capsules (800 mg total) by mouth daily. 180 capsule 3     multivitamin capsule Take 1 capsule by mouth daily.       OMEGA-3/DHA/EPA/FISH OIL (FISH OIL-OMEGA-3 FATTY ACIDS) 300-1,000 mg capsule Take 2 g by mouth daily.       omeprazole (PRILOSEC) 20 MG capsule Take 1 capsule (20 mg total) by mouth daily. 30 capsule 0     simvastatin (ZOCOR) 20 MG tablet TAKE 1 TABLET BY MOUTH EACH NIGHT AT BEDTIME 90 tablet 3     zolpidem  "(AMBIEN) 10 mg tablet Take 0.5 tablets (5 mg total) by mouth at bedtime as needed for sleep. 15 tablet 0     No current facility-administered medications for this visit.        No Known Allergies    Epworths Sleepiness Scale 3/4/2016 6/22/2016 4/2/2019   Sitting and reading 1 1 1   Watching TV 1 1 2   Sitting, inactive in a public place (e.g. a theatre or a meeting) 2 1 3   As a passenger in a car for an hour without a break 2 1 0   Lying down to rest in the afternoon when circumstances permit 2 1 3   Sitting and talking to someone 0 0 0   Sitting quietly after a lunch without alcohol 1 1 2   In a car, while stopped for a few minutes in traffic 0 0 0   Total score 9 6 11       Physical Exam:  /58   Pulse 73   Ht 5' 11\" (1.803 m)   Wt 218 lb (98.9 kg)   SpO2 95%   BMI 30.40 kg/m    BMI:Body mass index is 30.4 kg/m .   GEN: NAD, obese  Psych: normal mood, normal affect    Labs/Studies:     I reviewed the efficacy and compliance report from his device. Data summarized on the HPI and the CPAP compliance flow sheet.     Lab Results   Component Value Date    WBC 14.2 (H) 08/14/2018    HGB 15.8 08/14/2018    HCT 47.2 08/14/2018    MCV 96 08/14/2018     08/14/2018         Chemistry        Component Value Date/Time     12/10/2018 1448    K 4.3 12/10/2018 1448     12/10/2018 1448    CO2 29 12/10/2018 1448    BUN 22 12/10/2018 1448    CREATININE 0.87 12/10/2018 1448    GLU 78 12/10/2018 1448        Component Value Date/Time    CALCIUM 9.6 12/10/2018 1448    ALKPHOS 72 12/10/2018 1448    AST 28 12/10/2018 1448    ALT 33 12/10/2018 1448    BILITOT 0.4 12/10/2018 1448            No results found for: FERRITIN         Assessment and Plan:  In summary Rik Zarate is a 58 y.o. year old male who is here for follow-up.    1.  Obstructive sleep apnea on CPAP  I congratulated patient on his excellent CPAP usage.  I will keep him in the same pressure settings for now.  2.  Insomnia  I will renew the " patient's prescription for Ambien.  3.  Other sleep disturbance     Patient verbalized understanding of these issues, agrees with the plan and all questions were answered today. Patient was given an opportuntity to voice any other symptoms or concerns not listed above. Patient did not have any other symptoms or concerns.      Patient told to return in 12 months.    Mahesh Keller DO  Board Certified in Internal Medicine and Sleep Medicine  [facility].    more than 50% of the encounter was used for counseling or coordination of care.    (Note created with Dragon voice recognition and unintended spelling errors and word substitutions may occur)

## 2021-05-29 ENCOUNTER — RECORDS - HEALTHEAST (OUTPATIENT)
Dept: ADMINISTRATIVE | Facility: CLINIC | Age: 61
End: 2021-05-29

## 2021-05-29 NOTE — TELEPHONE ENCOUNTER
Refill Approved    Rx renewed per Medication Renewal Policy. Medication was last renewed on 4/5/19.    Jelena Cobos, [facility] Triage/Med Refill 6/24/2019     Requested Prescriptions   Pending Prescriptions Disp Refills     simvastatin (ZOCOR) 20 MG tablet [Pharmacy Med Name: SIMVASTATIN 20MG TABLET** 20 TAB] 90 tablet 1     Sig: TAKE 1 TABLET BY MOUTH EACH NIGHT AT BEDTIME       Statins Refill Protocol (Hmg CoA Reductase Inhibitors) Passed - 6/22/2019  2:08 PM        Passed - PCP or prescribing provider visit in past 12 months      Last office visit with prescriber/PCP: 3/19/2018 Ryan Hernandez MD OR same dept: Visit date not found OR same specialty: 8/14/2018 Hossein Rosario MD  Last physical: 12/10/2018 Last MTM visit: Visit date not found   Next visit within 3 mo: Visit date not found  Next physical within 3 mo: Visit date not found  Prescriber OR PCP: Ryan Hernandez MD  Last diagnosis associated with med order: 1. Pure hypercholesterolemia  - simvastatin (ZOCOR) 20 MG tablet [Pharmacy Med Name: SIMVASTATIN 20MG TABLET** 20 TAB]; TAKE 1 TABLET BY MOUTH EACH NIGHT AT BEDTIME  Dispense: 90 tablet; Refill: 1    If protocol passes may refill for 12 months if within 3 months of last provider visit (or a total of 15 months).

## 2021-05-30 ENCOUNTER — HEALTH MAINTENANCE LETTER (OUTPATIENT)
Age: 61
End: 2021-05-30

## 2021-05-30 VITALS — BODY MASS INDEX: 30.48 KG/M2 | WEIGHT: 217.7 LBS | HEIGHT: 71 IN

## 2021-05-30 VITALS — BODY MASS INDEX: 31.3 KG/M2 | WEIGHT: 223.6 LBS | HEIGHT: 71 IN

## 2021-05-30 VITALS — HEIGHT: 70 IN | BODY MASS INDEX: 31.07 KG/M2 | WEIGHT: 217 LBS

## 2021-05-30 VITALS — HEIGHT: 70 IN | BODY MASS INDEX: 30.92 KG/M2 | WEIGHT: 216 LBS

## 2021-05-30 VITALS — BODY MASS INDEX: 31.26 KG/M2 | WEIGHT: 218.4 LBS | HEIGHT: 70 IN

## 2021-05-30 VITALS — HEIGHT: 70 IN | WEIGHT: 221 LBS | BODY MASS INDEX: 31.64 KG/M2

## 2021-05-30 NOTE — TELEPHONE ENCOUNTER
"Please fax this patient's CPAP Rx to the DME below:  DME: [facility] ( Please put \"Att: Kenyetta)  Need New Supplies Or A New Machine?   Previous DME (If Applicable; If Switching):    Thank you.    "

## 2021-05-30 NOTE — TELEPHONE ENCOUNTER
Please fax this patient's CPAP Rx to the DME below:  DME: [facility]  Need New Supplies Or A New Machine? New supplies  Previous DME (If Applicable; If Switching):    Thank you.

## 2021-05-30 NOTE — TELEPHONE ENCOUNTER
Order for Durable Medical Equipment was processed and equipment ordered.     DME Company: [facility]    Date: 7/1/2019    Ordering Provider: Dr. Keller    Equipment Ordered: CPAP Supplies    Fax Number: [facility]: [phone]    Gosia Nelson CMA 7/1/2019 2:59 PM

## 2021-05-30 NOTE — TELEPHONE ENCOUNTER
Needing clarification on below. Called the patient at [phone], no answer, left a brief message for the patient to call back. When patient returns call, please clarify if he is requesting supplies, or a new device.     Gosia Nelson CMA 6/26/2019 9:34 AM

## 2021-05-31 VITALS — HEIGHT: 71 IN | BODY MASS INDEX: 29.26 KG/M2 | WEIGHT: 209 LBS

## 2021-05-31 VITALS — BODY MASS INDEX: 29.01 KG/M2 | HEIGHT: 71 IN | WEIGHT: 207.23 LBS

## 2021-05-31 VITALS — WEIGHT: 212 LBS | BODY MASS INDEX: 29.68 KG/M2 | HEIGHT: 71 IN

## 2021-05-31 VITALS — BODY MASS INDEX: 29.12 KG/M2 | HEIGHT: 71 IN | WEIGHT: 208 LBS

## 2021-05-31 VITALS — BODY MASS INDEX: 29.06 KG/M2 | HEIGHT: 71 IN | WEIGHT: 207.6 LBS

## 2021-06-01 VITALS — HEIGHT: 71 IN | WEIGHT: 219 LBS | BODY MASS INDEX: 30.66 KG/M2

## 2021-06-01 VITALS — WEIGHT: 218 LBS | HEIGHT: 71 IN | BODY MASS INDEX: 30.52 KG/M2

## 2021-06-01 VITALS — HEIGHT: 71 IN | WEIGHT: 218 LBS | BODY MASS INDEX: 30.52 KG/M2

## 2021-06-01 NOTE — TELEPHONE ENCOUNTER
RN cannot approve Refill Request    RN can NOT refill this medication PCP messaged that patient is overdue for Labs and Office Visit. Last office visit: 3/19/2018 Ryan Hernandez MD Last Physical: 12/10/2018 Last MTM visit: Visit date not found Last visit same specialty: 8/14/2018 Hossein Rosario MD.  Next visit within 3 mo: Visit date not found  Next physical within 3 mo: Visit date not found      Gina Mojica, [facility] Triage/Med Refill 9/27/2019    Requested Prescriptions   Pending Prescriptions Disp Refills     mesalamine (DELZICOL) 400 mg delayed-release capsule [Pharmacy Med Name: MESALAMINE 400 MG CPDR 400 CAP] 180 capsule 1     Sig: TAKE 2 CAPSULES (800 MG TOTAL) BY MOUTH DAILY.       Mesalamine Refill Protocol for Crohns Failed - 9/26/2019  5:00 PM        Failed - Visit with PCP or prescribing provider visit in last 6 months or next 3 months     Last office visit with prescriber/PCP: Visit date not found OR same dept: Visit date not found OR same specialty: 8/14/2018 Hossein Rosario MD Last physical: Visit date not found Last MTM visit: Visit date not found     Next appt within 3 mo: Visit date not found  Next physical within 3 mo: Visit date not found  Prescriber OR PCP: Ryan Hernandez MD  Last diagnosis associated with med order: 1. History of colitis  - mesalamine (DELZICOL) 400 mg delayed-release capsule [Pharmacy Med Name: MESALAMINE 400 MG CPDR 400 CAP]; TAKE 2 CAPSULES (800 MG TOTAL) BY MOUTH DAILY.  Dispense: 180 capsule; Refill: 1    If protocol passes may refill for 6 months if within 3 months of last provider visit (or a total of 9 months).              Failed - CBC (Hemogram 2) in last year      WBC   Date Value Ref Range Status   08/14/2018 14.2 (H) 4.0 - 11.0 thou/uL Final     RBC   Date Value Ref Range Status   08/14/2018 4.92 4.40 - 6.20 mill/uL Final     Hemoglobin   Date Value Ref Range Status   08/14/2018 15.8 14.0 - 18.0 g/dL Final     Hematocrit   Date Value Ref Range Status    08/14/2018 47.2 40.0 - 54.0 % Final     MCV   Date Value Ref Range Status   08/14/2018 96 80 - 100 fL Final     MCH   Date Value Ref Range Status   08/14/2018 32.1 27.0 - 34.0 pg Final     MCHC   Date Value Ref Range Status   08/14/2018 33.4 32.0 - 36.0 g/dL Final     RDW   Date Value Ref Range Status   08/14/2018 12.3 11.0 - 14.5 % Final     Platelets   Date Value Ref Range Status   08/14/2018 237 140 - 440 thou/uL Final     MPV   Date Value Ref Range Status   08/14/2018 8.3 7.0 - 10.0 fL Final                Passed - LFT or AST or ALT in last year     Albumin   Date Value Ref Range Status   12/10/2018 4.1 3.5 - 5.0 g/dL Final     Bilirubin, Total   Date Value Ref Range Status   12/10/2018 0.4 0.0 - 1.0 mg/dL Final     Alkaline Phosphatase   Date Value Ref Range Status   12/10/2018 72 45 - 120 U/L Final     AST   Date Value Ref Range Status   12/10/2018 28 0 - 40 U/L Final     ALT   Date Value Ref Range Status   12/10/2018 33 0 - 45 U/L Final     Protein, Total   Date Value Ref Range Status   12/10/2018 7.2 6.0 - 8.0 g/dL Final                Passed - Serum Creatinine in the last year     Creatinine   Date Value Ref Range Status   12/10/2018 0.87 0.70 - 1.30 mg/dL Final

## 2021-06-01 NOTE — TELEPHONE ENCOUNTER
This was completed. Edith actually called me for clarification, due to order stating [facility] as DME

## 2021-06-02 VITALS — BODY MASS INDEX: 30.36 KG/M2 | HEIGHT: 71 IN | WEIGHT: 216.9 LBS

## 2021-06-02 VITALS — WEIGHT: 218 LBS | HEIGHT: 71 IN | BODY MASS INDEX: 30.52 KG/M2

## 2021-06-02 NOTE — TELEPHONE ENCOUNTER
Zestar Study Consent Visit    Study description: ECG and PPG Study: Zestar Study      Rik Zarate a 58 y.o. male , was contacted by today to discuss participation in the Zestar study.     The patient called the Clinical Trials Office to inquire about study participation.  The consent form was reviewed with the patient.     The review of the study included:    Study purpose     Conflict of interest    Device description      Study visits    Risks of participation    Benefits (if any)    Alternatives    Voluntary participation    Confidentiality     Compensation/costs of participation    Study stipends    Injury and legal rights    The subject was queried in regards to his willingness to continue and come in for scheduled appointment. his questions were answered to his satisfaction.   The patient has given his preliminary agreement to volunteer to participate in the above noted study.     Plan: Rik Zarate will come to [facility] on 10/14/2019 to continue consent process. If he continues to agrees to participate, the study visit will be done on the same day.    Rosalinda Scott RN

## 2021-06-03 VITALS
HEIGHT: 70 IN | TEMPERATURE: 97.5 F | DIASTOLIC BLOOD PRESSURE: 87 MMHG | RESPIRATION RATE: 17 BRPM | SYSTOLIC BLOOD PRESSURE: 144 MMHG | BODY MASS INDEX: 31.65 KG/M2 | WEIGHT: 221.1 LBS | HEART RATE: 70 BPM

## 2021-06-03 NOTE — PROGRESS NOTES
[facility] HEART [facility]  Arrhythmia Clinic  Jones Leonardo    Referring:      Assessment:         Paroxysmal atrial fibrillation: The patient is approximately 1 year out from his ablation procedure to treat his atrial fibrillation.  He is had no symptomatic recurrence of atrial fibrillation or flutter.  He has had no ECG documented recurrence of atrial fibrillation or flutter.  Patient is off membrane active and rhythmic drug therapy.  The patient is off oral anticoagulant therapy as he has a low [id number] score.  He remains on aspirin therapy.    Hypertension: Patient's blood pressure was recently noted to be elevated.  The patient reports that he also reviewed his diet which is been quite high in sodium recently.  He is cut his sodium intake back dramatically and his blood pressure today is normal.    Obstructive sleep apnea: The patient is compliant with his CPAP therapy and recently was seen back in the sleep clinic.      Recommendations:    No change in current medical therapy.    Follow-up in the A. fib clinic in 1 year.      Patient Active Problem List   Diagnosis     Hypercholesterolemia     Actinic Keratosis     Shoulder Tendonitis     Cough     Non-Hodgkin's Lymphoma     Abdominal Pain     Paroxysmal atrial fibrillation (H)     JESSIE on CPAP     Marginal zone lymphoma (H)     Status post catheter ablation of atrial fibrillation     Atypical atrial flutter (H)     Typical atrial flutter (H)     History of colitis       Subjective:  Rik Zarate (59 y.o. male) returns to the arrhythmia clinic for interval reevaluation of his atrial fibrillation post ablation.  The patient is about 1 year out from his ablation procedure to treat his atrial fibrillation.  He continues to do well with no symptomatic recurrence of atrial fibrillation symptoms i.e. palpitations.  He does check his pulse with a phone Shadi and has had no documented recurrence of atrial fibrillation.  No exertional dyspnea or chest pain.  He  reports no orthopnea, PND or ankle edema.  He is compliant with his CPAP therapy.    Current Outpatient Medications   Medication Sig Dispense Refill     aspirin 81 MG EC tablet Take 1 tablet (81 mg total) by mouth daily. (Patient taking differently: Take 162 mg by mouth daily.       )       CALCIUM CITRATE/VITAMIN D3 (CITRACAL REGULAR ORAL) Take 1 tablet by mouth daily.       coQ10, ubiquinol, 100 mg cap Take 1 capsule by mouth daily.       loratadine (CLARITIN) 10 mg tablet Take 10 mg by mouth daily.       mesalamine (DELZICOL) 400 mg delayed-release capsule TAKE 2 CAPSULES (800 MG TOTAL) BY MOUTH DAILY. 180 capsule 1     multivitamin capsule Take 1 capsule by mouth daily.       NON FORMULARY Take 1 capsule by mouth daily.       OMEGA-3/DHA/EPA/FISH OIL (FISH OIL-OMEGA-3 FATTY ACIDS) 300-1,000 mg capsule Take 2 g by mouth daily.       omeprazole (PRILOSEC) 20 MG capsule Take 1 capsule (20 mg total) by mouth daily. 30 capsule 0     simvastatin (ZOCOR) 20 MG tablet TAKE 1 TABLET BY MOUTH EACH NIGHT AT BEDTIME 90 tablet 1     zolpidem (AMBIEN) 10 mg tablet TAKE 0.5 TABLETS (5 MG TOTAL) BY MOUTH AT BEDTIME AS NEEDED FOR SLEEP. 15 tablet 4     No current facility-administered medications for this visit.        Review of Systems:                                            Family History  Family History   Problem Relation Age of Onset     Snoring Father      Leukemia Father      Lung cancer Mother      No Medical Problems Sister      Prostate cancer Brother      No Medical Problems Brother      Kidney disease Sister      No Medical Problems Sister        Social History   reports that he quit smoking about 29 years ago. He has quit using smokeless tobacco. He reports current alcohol use of about 4.0 standard drinks of alcohol per week. He reports that he does not use drugs.    Objective:   Vital signs in last 24 hours:  /66 (Patient Site: Right Arm, Patient Position: Sitting, Cuff Size: Adult Regular)   Pulse 74    "Resp 16   Ht 5' 9\" (1.753 m)   Wt 220 lb (99.8 kg)   BMI 32.49 kg/m    Weight: Weight: 220 lb (99.8 kg)     Physical Exam:  General: The patient is alert oriented to person place and situation.  The patient is in no acute distress at the time of my evaluation.  Eyes: Pupils are equal, round, and reactive to light.  Conjunctiva and sclera are clear.  ENT: Oral mucosa is moist and without redness. No evident nasal discharge.  Pulmonary: Lungs are clear bilaterally with no rales, rhonchi, or wheezes.    Cardiovascular exam: Rhythm is regular. S1 and S2 are normal. No significant murmur is present. JVP is normal. Lower extremities demonstrate no significant edema. Distal pulses are intact bilaterally.  Abdomen is obese, soft, and nontender.  Musculoskeletal: Spine is straight. Extremities without deformity.  Neuro: Gait is normal.   Skin is warm, dry, and otherwise intact.      Cardiographics:       Lab Results:   Lab Results   Component Value Date     12/10/2018    K 4.3 12/10/2018     12/10/2018    CO2 29 12/10/2018    BUN 22 12/10/2018    CREATININE 0.87 12/10/2018    CALCIUM 9.6 12/10/2018     Lab Results   Component Value Date    WBC 14.2 (H) 08/14/2018    HGB 15.8 08/14/2018    HCT 47.2 08/14/2018    MCV 96 08/14/2018     08/14/2018     No results found for: INR      Outside record review:        "

## 2021-06-04 VITALS
WEIGHT: 220 LBS | RESPIRATION RATE: 16 BRPM | HEIGHT: 69 IN | HEART RATE: 74 BPM | SYSTOLIC BLOOD PRESSURE: 128 MMHG | DIASTOLIC BLOOD PRESSURE: 66 MMHG | BODY MASS INDEX: 32.58 KG/M2

## 2021-06-05 VITALS
RESPIRATION RATE: 16 BRPM | HEART RATE: 62 BPM | HEIGHT: 69 IN | BODY MASS INDEX: 29.92 KG/M2 | OXYGEN SATURATION: 97 % | DIASTOLIC BLOOD PRESSURE: 64 MMHG | SYSTOLIC BLOOD PRESSURE: 120 MMHG | WEIGHT: 202 LBS

## 2021-06-05 NOTE — TELEPHONE ENCOUNTER
RN cannot approve Refill Request    RN can NOT refill this medication Protocol failed and NO refill given.      Jelena Cobos, [facility] Triage/Med Refill 2/8/2020    Requested Prescriptions   Pending Prescriptions Disp Refills     mesalamine (DELZICOL) 400 mg delayed-release capsule [Pharmacy Med Name: MESALAMINE 400 MG CPDR 400 CAP] 180 capsule 1     Sig: TAKE 2 CAPSULES (800 MG TOTAL) BY MOUTH DAILY.       Mesalamine Refill Protocol for Crohns Failed - 2/7/2020  6:52 PM        Failed - LFT or AST or ALT in last year     Albumin   Date Value Ref Range Status   12/10/2018 4.1 3.5 - 5.0 g/dL Final     Bilirubin, Total   Date Value Ref Range Status   12/10/2018 0.4 0.0 - 1.0 mg/dL Final     Alkaline Phosphatase   Date Value Ref Range Status   12/10/2018 72 45 - 120 U/L Final     AST   Date Value Ref Range Status   12/10/2018 28 0 - 40 U/L Final     ALT   Date Value Ref Range Status   12/10/2018 33 0 - 45 U/L Final     Protein, Total   Date Value Ref Range Status   12/10/2018 7.2 6.0 - 8.0 g/dL Final                Failed - Visit with PCP or prescribing provider visit in last 6 months or next 3 months     Last office visit with prescriber/PCP: Visit date not found OR same dept: Visit date not found OR same specialty: 8/14/2018 Hossein Rosario MD Last physical: Visit date not found Last MTM visit: Visit date not found     Next appt within 3 mo: Visit date not found  Next physical within 3 mo: Visit date not found  Prescriber OR PCP: Ryan Hernandez MD  Last diagnosis associated with med order: 1. History of colitis  - mesalamine (DELZICOL) 400 mg delayed-release capsule [Pharmacy Med Name: MESALAMINE 400 MG CPDR 400 CAP]; TAKE 2 CAPSULES (800 MG TOTAL) BY MOUTH DAILY.  Dispense: 180 capsule; Refill: 1    2. Pure hypercholesterolemia  - simvastatin (ZOCOR) 20 MG tablet [Pharmacy Med Name: SIMVASTATIN 20MG TABLET** 20 TAB]; TAKE 1 TABLET BY MOUTH EACH NIGHT AT BEDTIME  Dispense: 90 tablet; Refill: 1    If protocol  passes may refill for 6 months if within 3 months of last provider visit (or a total of 9 months).              Failed - CBC (Hemogram 2) in last year      WBC   Date Value Ref Range Status   08/14/2018 14.2 (H) 4.0 - 11.0 thou/uL Final     RBC   Date Value Ref Range Status   08/14/2018 4.92 4.40 - 6.20 mill/uL Final     Hemoglobin   Date Value Ref Range Status   08/14/2018 15.8 14.0 - 18.0 g/dL Final     Hematocrit   Date Value Ref Range Status   08/14/2018 47.2 40.0 - 54.0 % Final     MCV   Date Value Ref Range Status   08/14/2018 96 80 - 100 fL Final     MCH   Date Value Ref Range Status   08/14/2018 32.1 27.0 - 34.0 pg Final     MCHC   Date Value Ref Range Status   08/14/2018 33.4 32.0 - 36.0 g/dL Final     RDW   Date Value Ref Range Status   08/14/2018 12.3 11.0 - 14.5 % Final     Platelets   Date Value Ref Range Status   08/14/2018 237 140 - 440 thou/uL Final     MPV   Date Value Ref Range Status   08/14/2018 8.3 7.0 - 10.0 fL Final                Failed - Serum Creatinine in the last year     Creatinine   Date Value Ref Range Status   12/10/2018 0.87 0.70 - 1.30 mg/dL Final             simvastatin (ZOCOR) 20 MG tablet [Pharmacy Med Name: SIMVASTATIN 20MG TABLET** 20 TAB] 90 tablet 1     Sig: TAKE 1 TABLET BY MOUTH EACH NIGHT AT BEDTIME       Statins Refill Protocol (Hmg CoA Reductase Inhibitors) Failed - 2/7/2020  6:52 PM        Failed - PCP or prescribing provider visit in past 12 months      Last office visit with prescriber/PCP: 3/19/2018 Ryan Hernandez MD OR same dept: Visit date not found OR same specialty: 8/14/2018 Hossein Rosario MD  Last physical: 12/10/2018 Last MTM visit: Visit date not found   Next visit within 3 mo: Visit date not found  Next physical within 3 mo: Visit date not found  Prescriber OR PCP: Ryan Hernandez MD  Last diagnosis associated with med order: 1. History of colitis  - mesalamine (DELZICOL) 400 mg delayed-release capsule [Pharmacy Med Name: MESALAMINE 400 MG CPDR 400 CAP];  TAKE 2 CAPSULES (800 MG TOTAL) BY MOUTH DAILY.  Dispense: 180 capsule; Refill: 1    2. Pure hypercholesterolemia  - simvastatin (ZOCOR) 20 MG tablet [Pharmacy Med Name: SIMVASTATIN 20MG TABLET** 20 TAB]; TAKE 1 TABLET BY MOUTH EACH NIGHT AT BEDTIME  Dispense: 90 tablet; Refill: 1    If protocol passes may refill for 12 months if within 3 months of last provider visit (or a total of 15 months).

## 2021-06-08 NOTE — PROGRESS NOTES
[facility]  Arrhythmia Clinic  Jones Leonardo    Referring:      Assessment:         Paroxysmal atrial fibrillation: The patient clearly has symptomatic atrial fibrillation.  This has continued despite use of membrane active antiarrhythmic drug therapy (flecainide).  Over the short-term the patient is a candidate for a change of medical therapy.  I have suggested a trial of sotalol therapy to see if this provides him any better control particularly as he plans on taking a couple of ischemic patient's over the next 6 weeks.  The patient is a good long-term candidate for ablation of his atrial fibrillation.  The underlying pathophysiology with the condition, mapping, and ablation strategies, as well as risks, benefits, and expected outcomes were all discussed in detail.  The patient will consider this option further.    Obstructive sleep apnea: The patient is compliant with CPAP therapy      Recommendations:     The patient's flecainide and metoprolol will be stopped at this time.    Sotalol 80 mg twice a day.  12-lead EKG will be performed on Friday.    If the patient's symptoms are improved on sotalol then we can leave him on that medication or consider up titration to 120 mg twice a day.     If the patient's symptoms are not improved on sotalol I would suggest switching back to flecainide and increasing his dose to 100 mg twice daily in combination with metoprolol.    Follow-up in the EP clinic with the EP nurse practitioner in 3 months.      Patient Active Problem List   Diagnosis     Hypercholesterolemia     Actinic Keratosis     Shoulder Tendonitis     Cough     Non-Hodgkin's Lymphoma     Abdominal Pain     Paroxysmal atrial fibrillation     JESSIE on CPAP     Marginal zone lymphoma       Subjective:  Rik Zarate (56 y.o. male) returns to the arrhythmia clinic for evaluation of atrial fibrillation management options.  The patient notes approximately 18 months of symptoms.  His atrial  "fibrillation was documented and diagnosed in 2016.  Initially the patient's symptoms occurred at night with a sensation of \"heart racing\".  Since that time the patient was placed on beta blocker therapy and he developed some mild shortness of breath and lightheadedness.  More recently the patient was started on flecainide about 4 weeks ago.  The patientcontinued to have recurrence of atrial fibrillation lasting anywhere from minutes up to 2 hours.  This is despite cutting back on caffeinated liquids in trying to manage his stress.  When the patient is out of rhythm and he tries to exercise his heart rate will quickly accelerated from the 90s up into the 130s to 150 beat per minute range.  He is not any presyncope or syncope.    Current Outpatient Prescriptions   Medication Sig Dispense Refill     aspirin 325 MG tablet Take 325 mg by mouth daily.       CALCIUM CITRATE/VITAMIN D3 (CITRACAL REGULAR ORAL) Take 1 tablet by mouth daily.       cholecalciferol, vitamin D3, (VITAMIN D3) 2,000 unit cap Take 1-2 tablets by mouth daily.        coQ10, ubiquinol, 100 mg cap Take 1 capsule by mouth daily.       mesalamine delayed-release (DELZICOL) 400 mg CpDR capsule Take 2 capsules (800 mg total) by mouth daily. 90 capsule 5     multivitamin capsule Take 1 capsule by mouth daily.       OMEGA-3/DHA/EPA/FISH OIL (FISH OIL-OMEGA-3 FATTY ACIDS) 300-1,000 mg capsule Take 2 g by mouth daily.       simvastatin (ZOCOR) 20 MG tablet TAKE 1 TABLET (20 MG TOTAL) BY MOUTH DAILY. 90 tablet 0     vitamin E 400 UNIT capsule Take 400 Units by mouth daily.       zolpidem (AMBIEN) 10 mg tablet as needed.   0     DELZICOL 400 mg delayed-release capsule Take 400 mg by mouth.  1     sotalol (BETAPACE) 80 MG tablet Take 1 tablet (80 mg total) by mouth 2 (two) times a day. 60 tablet 3     No current facility-administered medications for this visit.        Review of Systems:                                            Family History  Family History " "  Problem Relation Age of Onset     Snoring Father        Social History   reports that he has quit smoking. He does not have any smokeless tobacco history on file. He reports that he drinks about 6.0 - 7.2 oz of alcohol per week     Objective:   Vital signs in last 24 hours:  Visit Vitals     /60 (Patient Site: Left Arm, Patient Position: Sitting, Cuff Size: Adult Large)     Pulse (!) 54     Ht 5' 10\" (1.778 m)     Wt 218 lb 6.4 oz (99.1 kg)     BMI 31.34 kg/m2     Weight: Weight: 218 lb 6.4 oz (99.1 kg)     Physical Exam:  General: The patient is alert oriented to person place and situation.  The patient is in no acute distress at the time of my evaluation.  Eyes: Pupils are equal, round, and reactive to light.  Conjunctiva and sclera are clear.  ENT: Oral mucosa is moist and without redness. No evident nasal discharge.  Pulmonary: Lungs are clear bilaterally with no rales, rhonchi, or wheezes.    Cardiovascular exam: Rhythm is regular. S1 and S2 are normal. No significant murmur is present. JVP is normal. Lower extremities demonstrate no significant edema. Distal pulses are intact bilaterally.  Abdomen is flat, soft, and nontender.  Musculoskeletal: Spine is straight. Extremities without deformity.  Neuro: Gait is normal.   Skin is warm, dry, and otherwise intact.      Cardiographics:   EKGs echo and stress test have been reviewed.    Lab Results:   Lab Results   Component Value Date     11/16/2016    K 3.8 11/16/2016     11/16/2016    CO2 29 11/16/2016    BUN 25 (H) 11/16/2016    CREATININE 0.98 11/16/2016    CALCIUM 8.6 11/16/2016     Lab Results   Component Value Date    WBC 15.7 (H) 11/16/2016    HGB 15.8 11/16/2016    HCT 47.8 11/16/2016    MCV 97 11/16/2016     11/16/2016     No results found for: INR      Outside record review:        "

## 2021-06-08 NOTE — PROGRESS NOTES
Pt comes in to clinic today for an ekg after initiation of Sotalol 80 mg BID per Dr. Leonardo visit on 1-25-17.  EKG shows afib @77, , QTc 430.  Pt states that his initial dose of Sotalol made him feel very woozy, lightheaded and nauseated for about 15 minutes but that did resolve and he has taken a total of 3 doses and has not had any similar symptoms.  He was not able to start until Thursday am due to his pharmacy.  He states that he felt that he was in afib all day yesterday but felt that he was in SR today and is surprised to hear that he is still in afib.  He feels really good today, is able to do normal activities and is pleased with Sotalol at this point, though he is in Afib.  Reviewed Dr. Leonardo's note with the patient.  He is reluctant to increase dose or switch back to a higher dose of Flecainide at this point.  He would like to give it a few more days and see how he feels over the weekend.  QTc wnl but has only had 3 doses.  Suggested the patient come back to the clinic early next week for a repeat ekg.  At that point we can determine his QT and rhythm, also his symptoms with the medication.  He states agreement.  Reviewed contact information and he will call with questions or concerns.

## 2021-06-08 NOTE — PROGRESS NOTES
1960  [phone] (home)    +++Important patient information for CSC/Cath Lab staff : None+++    [facility] EP Cath Lab Procedure Order   Ablation Type:  PVI- Atrial Fibrillation  Specific location of pathway: Left    Diagnosis:  AF  Anticipated Case Duration:  4  Scheduling Needs/Timeframe:  Will need to start anticoagulant 4 wks prior- schedule accordingly    MD Preference: Dr Jorden MCKEE Assist:  No Specific MD:  N/A    Current Device: None None  Device Company/Device Rep Needed for Procedure: None    Pre-Procedural Testing needed: HEIDY and MRI  Mapping System Required:  MARGO  ICE Needed:  Yes  Anesthesia:  General-Whole Case  Research Protocol:  No    [facility] EP Cath Lab Prep   Ordering Provider: Dr Leonardo  Ordering Date: 2/3/2017  Orders Status: Intial order placed and Order set placed  EP NC Contact: Flores Moseley RN    H&P:  EP NP to complete within 1 wk prior to scheduled PVI  PCP: Ryan Hernandez MD, [phone]    Pre-op Labs: Done within 7 days    Medical Records Pertinent for Procedure:  stress test 12-29-16 negative, EF 65%, Holter 1-29-16 SR, Echo 2-10-16 EF 60% and EKG 1-31-17 SB @52,  1-27-17 Afib @77, 2-2-16 Afib @96    Patient Education:    Teach with Patient: Teach with pt completed same day as pre-op H&P-see additional clinic note    Risks Reviewed:     Pulmonary Vein/AF/Radiofrequency Ablation  In addition to standard risks for Radiofrequency Ablation, there is:    <2% for significant pulmonary vein stenosis    <2% risk for embolic events    <1% risk for esophageal fistula    <1% risk death    Pulmonary Vein Isolation / Cryoablation Risks:    1-2% risk for phrenic nerve paralysis    <1% risk for pulmonary vein stenosis    Risk of esophageal irritation with no incidence of atrial-esophageal fistula    Rare cryoballoon rupture  <1% risk death       Cardiac Catheter Ablation    <1% risk for the following: hypotension, hemorrhage, vascular injury including perforation of vein, artery or heart,  thrombophlebitis, systemic or pulmonic emboli; cardiac perforation, (tamponade), infection, pneumothorax, arrhythmias, proarrhythmic effects of drugs, radiation exposure.    1-2% complete heart block (for AVNRT or septol accessory pathway).    <0.5% CVA or MI    <0.1% death    If external defibrillation is needed, 75% risk for superficial burn.    1-2% tamponade and aortic puncture with left sided transeptal approach for left side MARGO - increase risk of CVA to <2%.  Late arrhythmia recurrences depends upon the primary rhythm disturbance.    Consent: Obtained in clinic by NC prior to procedure    Pre-Procedure Instructions that were Reviewed with Patient:  NPO after midnight, Notified patient of time and date of procedure by CV , Transportation arrangements needed s/p procedure, Post-procedure follow up process, Sedation plan/orders and Pre-procedure letter was sent to pt by CV     Pre-Procedure Medication Instructions:  Instructions given to pt regarding anticoagulants: Pt will be started on anticoagulant 1mo prior to PVI- instructed to continue anticoagulation uninterrupted through their procedure , start Eliquis one month prior per Jan  Instructions given to pt regarding antiarrhythmic medication: Sotalol; Pt instructed to hold 3 days prior to procedure  Instructions for medication, other than anticoagulants/antiarrhythmics listed above, given to pt: to take all morning medications with small sips of water, with the exception of OTC supplements and MVI am of HEIDY;  Hold all am medications with the exception of anticoagulation am of PVI    No Known Allergies    Current Outpatient Prescriptions:      aspirin 325 MG tablet, Take 325 mg by mouth daily., Disp: , Rfl:      CALCIUM CITRATE/VITAMIN D3 (CITRACAL REGULAR ORAL), Take 1 tablet by mouth daily., Disp: , Rfl:      cholecalciferol, vitamin D3, (VITAMIN D3) 2,000 unit cap, Take 1-2 tablets by mouth daily. , Disp: , Rfl:      coQ10, ubiquinol, 100  mg cap, Take 1 capsule by mouth daily., Disp: , Rfl:      DELZICOL 400 mg delayed-release capsule, Take 400 mg by mouth., Disp: , Rfl: 1     mesalamine delayed-release (DELZICOL) 400 mg CpDR capsule, Take 2 capsules (800 mg total) by mouth daily., Disp: 90 capsule, Rfl: 5     multivitamin capsule, Take 1 capsule by mouth daily., Disp: , Rfl:      OMEGA-3/DHA/EPA/FISH OIL (FISH OIL-OMEGA-3 FATTY ACIDS) 300-1,000 mg capsule, Take 2 g by mouth daily., Disp: , Rfl:      simvastatin (ZOCOR) 20 MG tablet, TAKE 1 TABLET (20 MG TOTAL) BY MOUTH DAILY., Disp: 90 tablet, Rfl: 0     sotalol (BETAPACE) 80 MG tablet, Take 1 tablet (80 mg total) by mouth 2 (two) times a day., Disp: 60 tablet, Rfl: 3     vitamin E 400 UNIT capsule, Take 400 Units by mouth daily., Disp: , Rfl:      zolpidem (AMBIEN) 10 mg tablet, as needed. , Disp: , Rfl: 0     zolpidem (AMBIEN) 10 mg tablet, TAKE 1 TABLET BY MOUTH AT BEDTIME AS NEEDED FOR SLEEP., Disp: 90 tablet, Rfl: 0

## 2021-06-08 NOTE — PROGRESS NOTES
Pt comes to clinic for a repeat EKG.    EKG today shows SB @ 52, , QTc 410.  Pt states he feels pretty good with Sotalol.  He was able to go skiing on Sunday and did really well, though afterwards he was very tired.  Explained side effects of Sotalol and that fatigue can certainly be one.  He is able to do normal activities without fatigue and feels that generally he feels well.   Answered all questions regarding possible PVI and he states he would like to think about it a for a little bit, he has a f/u apt with Sunny Henry on 3-22-17 and reviewed contact information for further questions.

## 2021-06-09 NOTE — PROGRESS NOTES
[facility] HEART [facility]   Arrhythmia Clinic    Assessment/Plan:  Diagnoses and all orders for this visit:    Paroxysmal atrial fibrillation February 2016.  He has failed flecainide.  Sotalol has also failed to hold him in regular rhythm.  He is on 80 mg of sotalol p.o. every 12 hours.  To continue this for now.  To stop sotalol 3 days prior to PVI and will start on metoprolol tartrate 50 mg 1 tab p.o. every 12 hours.  I discussed PVI as well as postop course and what to watch for in postop timeframe.  See after visit summary for more details.  He is now about a month before PVI so to stop aspirin and start Eliquis 5 mg 1 tab p.o. every 12 hours.  3 days prior to PVI will add on famotidine 20 mg 1 tab p.o. twice daily and continue  3 weeks post.  [id number] score of 0.  -     apixaban (ELIQUIS) 5 mg Tab tablet; Take 1 tablet (5 mg total) by mouth 2 (two) times a day.  Dispense: 60 tablet; Refill: 3  -     Discontinue: famotidine (PEPCID) 20 MG tablet; Take 1 tablet (20 mg total) by mouth 2 (two) times a day. Start 3 days before ablation.; Refill: 0  -     metoprolol tartrate (LOPRESSOR) 50 MG tablet; Take 1 tablet (50 mg total) by mouth 2 (two) times a day.  Dispense: 60 tablet; Refill: 3  -     famotidine (PEPCID) 20 MG tablet; Take 1 tablet (20 mg total) by mouth 2 (two) times a day. Start 3 days before ablation.  Dispense: 60 tablet; Refill: 0    JESSIE on CPAP and using CPAP consistently.    Other orders  -     penicillin VK (PEN VK) 500 MG tablet; Take 1 tablet by mouth 4 (four) times a day. Until gone; Refill: 0    ______________________________________________________________________    Subjective:    I had the opportunity to see Rik Zarate at the [facility]. Rik Zarate is a 56 y.o. male and here for history and physical prior to HEIDY and if that is negative pulmonary vein isolation ablation which is scheduled on April 19.   Rik Zarate has a known history of paroxysmal atrial fib since  February 2016. He was also diagnosed with obstructive sleep apnea and is using CPAP routinely now.  He has not noticed any particular triggers for atrial fib.  Joey tells me he is glad he is doing PVI and see has failed flecainide and sotalol is not working to keep him in rhythm.  He tells me he would estimate that he spending about 50% of the time in A. fib.  He knows he was in A. fib yesterday.  He suspicious it it he is in afib today.  On physical exam, i verified A. Fib.  Outside of symptoms with atrial fib, he denies any cardiac symptoms.  This visit will serve as history and physical for HEIDY and PVI on 4/19/17.  See problem list for more details.  Medical, past medical, surgical and social history reviewed.  Meds and allergies reviewed.   No Joey has never had any exposure to general anesthesia or surgery.  No family history of adverse reactions to anesthesia or abnormal bleeding with surgery.  Joey has never had general anesthesia or any surgery as he is quite healthy for his age.  Joey's medical history is most significant for non-Hodgkin's lymphoma and he has seen his oncologist  recently.  He has had no symptoms of recurrence.  Cardiac MR results reviewed.      ______________________________________________________________________    Problem List:  Patient Active Problem List   Diagnosis     Hypercholesterolemia     Actinic Keratosis     Shoulder Tendonitis     Cough     Non-Hodgkin's Lymphoma     Abdominal Pain     Paroxysmal atrial fibrillation     JESSIE on CPAP     Marginal zone lymphoma     Medical History:  Past Medical History:   Diagnosis Date     Atrial fibrillation      High cholesterol      Surgical History:  History reviewed. No pertinent surgical history.  Social History:  Social History   Substance Use Topics     Smoking status: Former Smoker     Smokeless tobacco: None      Comment: occasional cigar smoker     Alcohol use 6.0 - 7.2 oz/week     10 - 12 Shots of liquor per week        Review of  Systems: Review of Systems:   General: WNL  Eyes: WNL  Ears/Nose/Throat: WNL  Lungs: WNL  Heart: WNL  Stomach: WNL  Bladder: WNL  Muscle/Joints: WNL  Skin: WNL  Nervous System: WNL  Mental Health: WNL     Blood: WNL      Family History:  Family History   Problem Relation Age of Onset     Snoring Father      Leukemia Father      Lung cancer Mother      No Medical Problems Sister      Prostate cancer Brother      No Medical Problems Brother      Kidney disease Sister      No Medical Problems Sister          Allergies:  No Known Allergies  Medications:  Current Outpatient Prescriptions   Medication Sig Dispense Refill     CALCIUM CITRATE/VITAMIN D3 (CITRACAL REGULAR ORAL) Take 1 tablet by mouth daily.       coQ10, ubiquinol, 100 mg cap Take 1 capsule by mouth daily.       mesalamine delayed-release (DELZICOL) 400 mg CpDR capsule Take 2 capsules (800 mg total) by mouth daily. 90 capsule 5     multivitamin capsule Take 1 capsule by mouth daily.       OMEGA-3/DHA/EPA/FISH OIL (FISH OIL-OMEGA-3 FATTY ACIDS) 300-1,000 mg capsule Take 2 g by mouth daily.       penicillin VK (PEN VK) 500 MG tablet Take 1 tablet by mouth 4 (four) times a day. Until gone  0     simvastatin (ZOCOR) 20 MG tablet TAKE 1 TABLET BY MOUTH EACH NIGHT AT BEDTIME 90 tablet 2     vitamin E 400 UNIT capsule Take 400 Units by mouth daily.       zolpidem (AMBIEN) 10 mg tablet TAKE 1 TABLET BY MOUTH AT BEDTIME AS NEEDED FOR SLEEP. 90 tablet 0     apixaban (ELIQUIS) 5 mg Tab tablet Take 1 tablet (5 mg total) by mouth 2 (two) times a day. 60 tablet 3     cholecalciferol, vitamin D3, (VITAMIN D3) 2,000 unit cap Take 1-2 tablets by mouth daily.        DELZICOL 400 mg delayed-release capsule Take 800 mg by mouth daily.   1     famotidine (PEPCID) 20 MG tablet Take 1 tablet (20 mg total) by mouth 2 (two) times a day. Start 3 days before ablation. 60 tablet 0     metoprolol tartrate (LOPRESSOR) 50 MG tablet Take 1 tablet (50 mg total) by mouth 2 (two) times a day.  "60 tablet 3     simvastatin (ZOCOR) 20 MG tablet TAKE 1 TABLET (20 MG TOTAL) BY MOUTH DAILY. 90 tablet 0     zolpidem (AMBIEN) 10 mg tablet as needed.   0     No current facility-administered medications for this visit.        Objective:   Vital signs:  Visit Vitals     /72 (Patient Site: Left Arm, Patient Position: Sitting, Cuff Size: Adult Large)     Pulse 72     Resp 16     Ht 5' 10\" (1.778 m)     Wt 216 lb (98 kg)     BMI 30.99 kg/m2         Physical Exam:    GENERAL APPEARANCE: Alert, cooperative and in no acute distress.  HEENT: No scleral icterus. No Xanthelasma. Oral mucuos membranes pink and moist.  NECK: JVP Nl.   CHEST: clear to auscultation  CARDIOVASCULAR: S1, S2 without murmur ,clicks or rubs. Irregular, irregular.  Radial and posterior tibial pulses are intact and symetric.   EXTREMITIES: No cyanosis, clubbing or edema.    Results personally reviewed:  February 2016 echo shows EF 60%. No significant valve disease. Left atrium mildly enlarged  February 2, 2016 12-lead EKG shows atrial fib with controlled ventricular response of 96.  December 2016 nuclear stress test normal.  February 2017 cardiac MR shows:    IMPRESSIONS:    1. There are 4 pulmonary veins with normal configuration. Please see above measurements.  2. Esophagus is adjacent to the posterior side of left inferior pulmonary vein.  3. Left atrium is mildly enlarged.  4. Normal left ventricular size, wall thickness and function. The quantified left ventricular ejection fraction is 66%. No myocardial scar is identified.   5. Normal right ventricular size function.   6. No obvious valvular disease.  No extracardiac findings    Results for orders placed or performed in visit on 01/31/17   ECG 12 lead   Result Value Ref Range    SYSTOLIC BLOOD PRESSURE  mmHg    DIASTOLIC BLOOD PRESSURE  mmHg    VENTRICULAR RATE 52 BPM    ATRIAL RATE 52 BPM    P-R INTERVAL 162 ms    QRS DURATION 84 ms    Q-T INTERVAL 454 ms    QTC CALCULATION (BEZET) 422 ms "    P Axis 45 degrees    R AXIS 81 degrees    T AXIS 46 degrees    MUSE DIAGNOSIS       Sinus bradycardia  Otherwise normal ECG  When compared with ECG of 27-JAN-2017 10:28,  Sinus rhythm has replaced Atrial fibrillation  Vent. rate has decreased BY  25 BPM  Confirmed by RUDOLPH MCKEE, LES LOC:JN ([id number]) on 1/31/2017 4:02:56 PM         TSH:   Lab Results   Component Value Date    TSH 2.60 01/11/2016     BNP: No results found for: BNP  BMP:  Lab Results   Component Value Date    CREATININE 0.98 11/16/2016    BUN 25 (H) 11/16/2016     11/16/2016    K 3.8 11/16/2016     11/16/2016    CO2 29 11/16/2016       This note has been dictated using voice recognition software. Any grammatical or context distortions are unintentional and inherent to the software.    MILAGROS RIOS, RN, [facility]ART CARE  [phone]

## 2021-06-09 NOTE — PROGRESS NOTES
PVI education was completed:     See documented H&P completed by NP in EPIC    Reviewed pre and post op PVI procedure with pt, pre-procedure letter reviewed and given to pt- see letter in EPIC under documentation, see additional EP PVI prep note for details on procedure/prep, risks of procedure discussed, answered pt questions and concerns regarding procedure, consent was signed, time spent with pt was >45min and reviewed available pre-op lab results.    Discussed importance of continuing anticoagulation uninterrupted pre and post ablation, pt denies missing any doses of their anticoagulant, pt denies tenorio placement in the past and has no history of prostate disease, instructions given to pt to start Pepcid 20mg BID 3 days prior to PVI, and to continue 3 wks s/p PVI, prescription was sent for pepcid 20mg BID and instructions given to pt to stop Sotalol on 4-16-17 prior to PVI.    Pt will start Eliquis 5 mg BID today.

## 2021-06-09 NOTE — PATIENT INSTRUCTIONS - HE
Cervical radiculopathy sensory symptoms, duration 1 week, distribution corresponds to right C7, in the context of known cervical degenerative disc disease and history of cervical radiculopathy symptoms involving his left upper extremity    I explained to him the entity of C7 cervical radiculopathy, where there is compression on nerve root exiting the C-spine between cervical vertebra 6 and 7.  Foraminal stenosis is a result of degenerative disc disease and facet arthritis.    He is describing sensory symptoms only on the right side, no motor weakness that he can perceive.  Pain intensity seems to be moderate, mostly 2 out of 10, but occasional bursts of more severe 8/10 pain, especially when he is driving.    I told him that an attack of cervical radiculopathy like this often resolves after a couple of weeks with conservative management, which means posture improvements, use of anti-inflammatory medication, and avoidance of mechanical stress on the neck.    I suggested that he not play golf for couple weeks because of the rotational stress that applies to the neck.  He is already figured out how to adjust his car seat to a more upright posture, which hopefully will relax his neck muscles, thereby reducing compression forces on the C-spine.  I recommended that he buy a contoured neck pillow so that he can sleep on his back, and his neck would be supported in a neutral relaxed position.    He remember some of the home exercises taught to him by a physical therapist when he had his previous attack of cervical radiculopathy.  I emphasized the chin tuck exercise, done isometrically for 30 seconds per repetition.  He also needs to work on strengthening his upper back muscles, in order to pull back his shoulders.    With regards to medication, his symptoms do not sound severe enough to pursue a course of corticosteroids.  He did try taking ibuprofen, but that did not seem to make much difference.    I told him that if his  symptoms are persisting but especially progressing at the 2 to 3-week point, to follow-up with us here in the clinic, so that we could consider getting an MRI of his C-spine, which may lead to intervention such as cervical epidural steroid injection.    Other medical history of    Paroxysmal atrial fibrillation, not requiring anticoagulant or antiarrhythmic therapy  Hypertension, currently not on blood pressure medication  Obstructive sleep apnea  History of colitis  History of marginal zone lymphoma

## 2021-06-09 NOTE — TELEPHONE ENCOUNTER
COVID 19 Nurse Triage Plan/Patient Instructions    Please be aware that novel coronavirus (COVID-19) may be circulating in the community. If you develop symptoms such as fever, cough, or SOB or if you have concerns about the presence of another infection including coronavirus (COVID-19), please contact your health care provider or visit [email or web address].     Disposition/Instructions    Patient to schedule an In Person Visit with provider. Reference Visit Selection Guide.    Thank you for taking steps to prevent the spread of this virus.  o Limit your contact with others.  o Wear a simple mask to cover your cough.  o Wash your hands well and often.    Resources    [facility]: About COVID-19: [email or web address]    CDC: What to Do If You're Sick: www.cdc.gov/coronavirus/2019-ncov/about/steps-when-sick.html    CDC: Ending Home Isolation: www.cdc.gov/coronavirus/2019-ncov/hcp/disposition-in-home-patients.html     CDC: Caring for Someone: www.cdc.gov/coronavirus/2019-ncov/if-you-are-sick/care-for-someone.html     [facility]: Interim Guidance for Hospital Discharge to Home: www.health.[facility]/diseases/coronavirus/hcp/hospdischarge.pdf    [facility] clinical trials (COVID-19 research studies): [email or web address]-clinical-trials     Below are the COVID-19 hotlines at the [place] Department of [facility] ([facility]). Interpreters are available.   o For health questions: Call 262-372-6161 or 1-403.252.6435 (7 a.m. to 7 p.m.)  o For questions about schools and childcare: Call 716-802-9392 or 1-252.979.2290 (7 a.m. to 7 p.m.)   RN triage -   Call from pt   Pt states was dx years ago with spinal stenosis   About 1 week ago - woke w/ sore / stiff neck -- R side - and R arm   No injury or over use   Has some numbness and tingling of R wrist - - off and on   Not sleeping well due to pain   Feeling fatigue --   Yesterday = played golf- felt fine -- and slept well last night   Today pain again   Pain  varies with movement and position rates 5/10 -- sometimes up to 8/10-- sometimes less than 5   No chest pain - no diff breathing - no shortness of breath -- no palpitations   No sore throat or swollen glands   Pt exposed to person 3 weeks ago -- who tested positive covid 10 days ago -- pt has no cough/fever/diff breathing /chest pain   Reviewed home care advice   Transferred to    Milly King RN [facility] RN triage        Reason for Disposition    Numbness in an arm or hand (i.e., loss of sensation)    Additional Information    Negative: Shock suspected (e.g., cold/pale/clammy skin, too weak to stand, low BP, rapid pulse)    Negative: Similar pain previously and it was from 'heart attack'    Negative: Similar pain previously from 'angina' and not relieved by nitroglycerin    Negative: Difficult to awaken or acting confused (e.g., disoriented, slurred speech)    Negative: Sounds like a life-threatening emergency to the triager    Negative: Followed an injury to neck (e.g., MVA, sports, impact or collision)    Negative: Chest pain    Negative: Lymph node in the neck is swollen or painful to the touch    Negative: Sore throat is the main symptom    Negative: Difficulty breathing or unusual sweating (e.g., sweating without exertion)    Negative: Chest pain lasting longer than 5 minutes    Negative: Stiff neck (can't touch chin to chest) and has headache    Negative: Stiff neck (can't touch chin to chest) and fever    Negative: Weakness of an arm or hand    Negative: Problems with bowel or bladder control    Negative: SEVERE pain (e.g., excruciating, unable to do any normal activities)    Negative: Head is twisting to one side (or ask 'is it turning against your will?')    Negative: Fever > 103 F (39.4 C)    Negative: Fever > 100.0 F (37.8 C) and IVDA (intravenous drug abuse)    Negative: Fever > 100.0 F (37.8 C) and has diabetes mellitus or a weak immune system (e.g., HIV positive, cancer  chemotherapy, organ transplant, splenectomy, chronic steroids)    Protocols used: NECK PAIN OR [id number]

## 2021-06-09 NOTE — TELEPHONE ENCOUNTER
LMTCB - Dr. Zimmerman would like this appt switched to VV using [employer] (text message link sent to smart phone) due to pt's exposure to COVID - for the safety of everyone Dr. Zimmerman would not like pt to come in for OV - again Video visit     SOFI Rivera  11:37 AM ........ 7/2/2020

## 2021-06-09 NOTE — PROGRESS NOTES
"Rik Zarate is a 59 y.o. male who is being evaluated via a billable video visit.      The patient has been notified of following:     \"This video visit will be conducted via a call between you and your physician/provider. We have found that certain health care needs can be provided without the need for an in-person physical exam.  This service lets us provide the care you need with a video conversation.  If a prescription is necessary we can send it directly to your pharmacy.  If lab work is needed we can place an order for that and you can then stop by our lab to have the test done at a later time.    Video visits are billed at different rates depending on your insurance coverage. Please reach out to your insurance provider with any questions.    If during the course of the call the physician/provider feels a video visit is not appropriate, you will not be charged for this service.\"    Patient has given verbal consent to a Video visit? Yes  How would you like to obtain your AVS? AVS Preference: [employer].  Patient would like the video invitation sent by: Text to cell phone: [phone]  Will anyone else be joining your video visit? No           Video Start Time: 1:01 PM      Chief Complaint   Patient presents with     Neck Pain     stiff      Elbow Pain     Right - sharp pains      Today's video visit is to address a symptom he is been experiencing for the last week with stiff neck and pain, numbness, and tingling in his right upper extremity (triceps area, around the elbow, down the forearm into the wrist)    He had experienced similar symptoms on the left side years ago, and actually is left with a little bit of subjective muscle weakness in the left upper extremity.  He actually is left-handed but golfs right-handed.    He recalls having had an MRI of his C-spine, showing \"stenosis\", which I suspect refers to foraminal stenosis, not central canal stenosis.     The more recent symptoms started about 1 week ago " involving the right upper extremity.  He woke up with a sore and stiff neck after playing golf the day before.  Yesterday = played golf- felt fine -- and slept well last night   Today pain again --same distribution.  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  He says the pain gets worse after prolonged time in the  seat.  He tried adjusting the seat to more upright, and that seems to help.    During the video interview, he appeared well, breathing comfortably.  He showed me his right upper extremity tracing out the areas where he feels the discomfort.  Appears to correspond to C7 distribution.         Paroxysmal atrial fibrillation: The pag Patient is off membrane active and rhythmic drug therapy.  The patient is off oral anticoagulant therapy as he has a low [id number] score.  He remains on aspirin therapy.   Hypertension: Patient's blood pressure was recently noted to be elevated.  The patient reports that he also reviewed his diet which is been quite high in sodium recently.  He is cut his sodium intake back dramatically and his blood pressure today is normal.   Obstructive sleep apnea: The patient is compliant with his CPAP therapy and recently was seen back in the sleep clinic.    History of colitis.     Marginal zone lymphoma (H)  Follows closely with Dr. Rosas        Current Outpatient Medications   Medication Sig Dispense Refill     aspirin 81 MG EC tablet Take 1 tablet (81 mg total) by mouth daily. (Patient taking differently: Take 162 mg by mouth daily.       )       CALCIUM CITRATE/VITAMIN D3 (CITRACAL REGULAR ORAL) Take 1 tablet by mouth daily.       coQ10, ubiquinol, 100 mg cap Take 1 capsule by mouth daily.       loratadine (CLARITIN) 10 mg tablet Take 10 mg by mouth daily.       mesalamine (DELZICOL) 400 mg delayed-release capsule TAKE 2 CAPSULES (800 MG TOTAL)  BY MOUTH DAILY. 180 capsule 3     multivitamin capsule Take 1 capsule by mouth daily.       NON FORMULARY Take 1 capsule by mouth daily.       OMEGA-3/DHA/EPA/FISH OIL (FISH OIL-OMEGA-3 FATTY ACIDS) 300-1,000 mg capsule Take 2 g by mouth daily.       simvastatin (ZOCOR) 20 MG tablet TAKE 1 TABLET BY MOUTH EACH NIGHT AT BEDTIME 90 tablet 3     zolpidem (AMBIEN) 10 mg tablet TAKE A HALF TABLETS (5 MG) BY MOUTH AT BEDTIME AS NEEDED FOR SLEEP. 15 tablet 4     omeprazole (PRILOSEC) 20 MG capsule Take 1 capsule (20 mg total) by mouth daily. 30 capsule 0     No current facility-administered medications for this visit.      No Known Allergies  Social History     Tobacco Use     Smoking status: Former Smoker     Last attempt to quit: 1990     Years since quittin.5     Smokeless tobacco: Former User     Tobacco comment: occasional cigar smoker   Substance Use Topics     Alcohol use: Yes     Alcohol/week: 4.0 standard drinks     Types: 1 Cans of beer, 3 Shots of liquor per week     Comment: weekly     Drug use: No     Comment: Occasional use.     Patient Active Problem List   Diagnosis     Hypercholesterolemia     Actinic Keratosis     Shoulder Tendonitis     Cough     Non-Hodgkin's Lymphoma     Abdominal Pain     Paroxysmal atrial fibrillation (H)     JESSIE on CPAP     Marginal zone lymphoma (H)     Status post catheter ablation of atrial fibrillation     Atypical atrial flutter (H)     Typical atrial flutter (H)     History of colitis        Reviewed, reconciled and updated         ASSESSMENT PLAN    Cervical radiculopathy sensory symptoms, duration 1 week, distribution corresponds to right C7, in the context of known cervical degenerative disc disease and history of cervical radiculopathy symptoms involving his left upper extremity    I explained to him the entity of C7 cervical radiculopathy, where there is compression on nerve root exiting the C-spine between cervical vertebra 6 and 7.  Foraminal stenosis is a  result of degenerative disc disease and facet arthritis.    He is describing sensory symptoms only on the right side, no motor weakness that he can perceive.  Pain intensity seems to be moderate, mostly 2 out of 10, but occasional bursts of more severe 8/10 pain, especially when he is driving.    I told him that an attack of cervical radiculopathy like this often resolves after a couple of weeks with conservative management, which means posture improvements, use of anti-inflammatory medication, and avoidance of mechanical stress on the neck.    I suggested that he not play golf for couple weeks because of the rotational stress that applies to the neck.  He is already figured out how to adjust his car seat to a more upright posture, which hopefully will relax his neck muscles, thereby reducing compression forces on the C-spine.  I recommended that he buy a contoured neck pillow so that he can sleep on his back, and his neck would be supported in a neutral relaxed position.    He remember some of the home exercises taught to him by a physical therapist when he had his previous attack of cervical radiculopathy.  I emphasized the chin tuck exercise, done isometrically for 30 seconds per repetition.  He also needs to work on strengthening his upper back muscles, in order to pull back his shoulders.    With regards to medication, his symptoms do not sound severe enough to pursue a course of corticosteroids.  He did try taking ibuprofen, but that did not seem to make much difference.    I told him that if his symptoms are persisting but especially progressing at the 2 to 3-week point, to follow-up with us here in the clinic, so that we could consider getting an MRI of his C-spine, which may lead to intervention such as cervical epidural steroid injection.    Other medical history of    Paroxysmal atrial fibrillation, not requiring anticoagulant or antiarrhythmic therapy  Hypertension, currently not on blood pressure  medication  Obstructive sleep apnea  History of colitis  History of marginal zone lymphoma        Video-Visit Details    Type of service:  Video Visit    Video End Time (time video stopped): 1:23 PM  Originating Location (pt. Location): Home    Distant Location (provider location):  [facility] INTERNAL MEDICINE     Platform used for Video Visit: Quentin Zimmerman MD

## 2021-06-09 NOTE — PROGRESS NOTES
HISTORY OF PRESENT ILLNESS  Patient with history of cerumen impaction comes in for debridement of recurrent cerumen impaction.     REVIEW OF SYSTEMS  Review of Systems: a 10-system review was performed. Pertinent positives are noted in the HPI and on a separate scanned document in the chart.    PMH, PSH, FH and SH has documented in the EHR.      EXAM    CONSTITUTIONAL  General Appearance:  Normal, well developed, well nourished, no obvious distress  Ability to Communicate:  communicates appropriately.    HEAD AND FACE  Appearance and Symmetry:  Normal, no scalp or facial scarring or suspicious lesions.    EARS  Clinical speech reception threshold:  Normal, able to hear normal speech.  Auricle:  Normal, Auricles without scars, lesions, masses.  External auditory canal:  Bilateral cerumen impaction debrided under otomicroscopy using microdissection technique.    Tympanic membrane:  Normal, Tympanic membranes normal without swelling or erythema.    NOSE (speculum or scope)  Architecture:  Normal, Grossly normal external nasal architecture with no masses or lesions.    NEUROLOGICAL  Speech pattern:  Normal, Proasaic    RESPIRATORY  Symmetry and Respiratory effort:  Normal, Symmetric chest movement and expansion with no increased intercostal retractions or use of accessory muscles.     IMPRESSION  Bilateral cerumen impaction    RECOMMENDATION  Follow up as needed.    Dionisio Jolly MD

## 2021-06-10 NOTE — ANESTHESIA POSTPROCEDURE EVALUATION
Patient: Rik Zarate  EP Ablation PVI - **HEIDY AT 7:30AM IN [address] PRIOR TO PVI GENERAL ANESTHESIA WHOLE CASE, 4 HRS, ICE, MARGO BOOKED, H&P 3/22, TEACH-BETO (RON)  HEIDY IN [address] BEFORE 0900AM PROCEDURE, EP Ablation Atrial Flutter  Anesthesia type: general    Patient location: ICU  Last vitals:   Vitals:    04/20/17 0628   BP:    Pulse: (!) 58   Resp: 27   Temp:    SpO2: 92%     Post vital signs: stable  Level of consciousness: awake, alert and oriented  Post-anesthesia pain: pain controlled  Post-anesthesia nausea and vomiting: no  Pulmonary: unassisted, return to baseline  Cardiovascular: stable and blood pressure at baseline  Hydration: adequate  Anesthetic events: no    QCDR Measures:  ASA# 11 - Archana-op Cardiac Arrest: ASA11B - Patient did NOT experience unanticipated cardiac arrest  ASA# 12 - Archana-op Mortality Rate: ASA12B - Patient did NOT die  ASA# 13 - PACU Re-Intubation Rate: ASA13B - Patient did NOT require a new airway mgmt  ASA# 10 - Composite Anes Safety: ASA10A - No serious adverse event  ASA# 38 - New Corneal Injury: ASA38A - No new exposure keratitis or corneal abrasion in PACU    Additional Notes:

## 2021-06-10 NOTE — ANESTHESIA CARE TRANSFER NOTE
Pt breathing spontaneously, follows commands, suctioned extubated. Spontaneous respirations with SFM to 5122 on monitor. VSS. Report to RN.          Last vitals:   Vitals:    04/19/17 1548   BP: 128/71   Pulse: 70   Resp: 16   Temp: 37.7  C (99.8  F)   SpO2: 98%     Patient's level of consciousness is drowsy  Spontaneous respirations: yes  Maintains airway independently: yes  Dentition unchanged: yes  Oropharynx: oropharynx clear of all foreign objects    QCDR Measures:  ASA# 20 - Surgical Safety Checklist: ASA20A - Safety Checks Done  PQRS# 430 - Adult PONV Prevention: 4558F - Pt received => 2 anti-emetic agents (different classes) preop & intraop  ASA# 8 - Peds PONV Prevention: NA - Not pediatric patient, not GA or 2 or more risk factors NOT present  PQRS# 424 - Archana-op Temp Management: 4559F - At least one body temp DOCUMENTED => 35.5C or 95.9F within required timeframe  PQRS# 426 - PACU Transfer Protocol: - Transfer of care checklist used  ASA# 14 - Acute Post-op Pain: ASA14B - Patient did NOT experience pain >= 7 out of 10    I completed my SBAR handoff to the receiving nurse per policy and procedure.

## 2021-06-10 NOTE — PROGRESS NOTES
1960  [phone] (home)  [phone] (mobile)    +++Important patient information for [facility]/Cath Lab staff : None+++    [facility] EP Cath Lab Procedure Order   Cardioversion:  Cardioversion    Diagnosis:  Aflutter  Anticipated Case Duration:  Standard  Scheduling Needs/Timeframe:  Pt to have done either 5-16 or 5-17    Current Device: None None  Device Company/Device Rep Needed for Procedure: None    Anesthesia:  General-CV Only  Research Protocol:  No    [facility] EP Cath Lab Prep   Ordering Provider: Dr Leonardo  Ordering Date: 5/9/2017  Orders Status: Intial order placed and Order set placed  EP NC Contact: Flores Moseley RN    H&P:  Can we use H&P update from 4-19?  otherwise with PMD /CLEMENTINE PINEDA WILL DO H&P PRIOR  PCP: Ryan Hernandez MD, [phone]    Pre-op Labs: N/A for procedure    Medical Records Pertinent for Procedure:  Stress test 12-29-16 negative EF 65%, CT/MRI MR of pulmonary veins 2-20-17, HEIDY 4-19-17 EF 55%, EKG 5-8-17 Aflutter @79,   3-22-17 Afib @82,   1-31-17 SB @52 and Ablation Record PVI w/SWA 4-19-17    Patient Education:    Teach with Patient: Completed via phone prior to procedure    Risks Reviewed:     Cardioversion    >90% acute success rate, <10% failure to convert or   reverts shortly after cardioversion.    <1% embolic event of (CVA, pulmonary embolism, or   other site).    75% risk for superficial burn.  Risks associated with general anesthesia will be addressed by the Anesthesiology Department    Consent: Will be obtained in [facility] day of procedure    Pre-Procedure Instructions that were Reviewed with Patient:  NPO after midnight, Notified patient of time and date of procedure by CV , Transportation arrangements needed s/p procedure, Post-procedure follow up process, Sedation plan/orders and Pre-procedure letter was sent to pt by CV     Pre-Procedure Medication Instructions:  Instructions given to pt regarding anticoagulants: Eliquis- instructed to continue anticoagulation  uninterrupted through their procedure  Instructions given to pt regarding antiarrhythmic medication: Pt to start Sotalol 40 mg BID day prior to CV;  Pt to wear holter day of or day after; Pt instructed to start medication Sotalol 1 days prior to procedure  Instructions for medication, other than anticoagulants/antiarrhythmics listed above, given to pt: Pt instructed to start medication Sotalol 1 days prior to procedure    No Known Allergies    Current Outpatient Prescriptions:      apixaban (ELIQUIS) 5 mg Tab tablet, Take 1 tablet (5 mg total) by mouth 2 (two) times a day., Disp: 60 tablet, Rfl: 3     aspirin 81 MG EC tablet, Take 1 tablet (81 mg total) by mouth daily., Disp: , Rfl: 0     CALCIUM CITRATE/VITAMIN D3 (CITRACAL REGULAR ORAL), Take 1 tablet by mouth daily., Disp: , Rfl:      cholecalciferol, vitamin D3, (VITAMIN D3) 2,000 unit cap, Take 1-2 tablets by mouth daily. , Disp: , Rfl:      coQ10, ubiquinol, 100 mg cap, Take 1 capsule by mouth daily., Disp: , Rfl:      DELZICOL 400 mg delayed-release capsule, TAKE 2 CAPSULES BY MOUTH DAILY, Disp: 180 capsule, Rfl: 2     famotidine (PEPCID) 20 MG tablet, Take 1 tablet (20 mg total) by mouth 2 (two) times a day. Start 3 days before ablation., Disp: 48 tablet, Rfl: 0     mesalamine delayed-release (DELZICOL) 400 mg CpDR capsule, Take 2 capsules (800 mg total) by mouth daily., Disp: 90 capsule, Rfl: 5     multivitamin capsule, Take 1 capsule by mouth daily., Disp: , Rfl:      OMEGA-3/DHA/EPA/FISH OIL (FISH OIL-OMEGA-3 FATTY ACIDS) 300-1,000 mg capsule, Take 2 g by mouth daily., Disp: , Rfl:      simvastatin (ZOCOR) 20 MG tablet, TAKE 1 TABLET BY MOUTH EACH NIGHT AT BEDTIME, Disp: 90 tablet, Rfl: 2     zolpidem (AMBIEN) 10 mg tablet, as needed. , Disp: , Rfl: 0

## 2021-06-10 NOTE — ANESTHESIA PROCEDURE NOTES
HEIDY    Patient location during procedure: OR  Start time: 4/19/2017 8:35 AM  Staffing:  Performing  Anesthesiologist: JASON DORAN  HEIDY:  Type/Reason: Monitoring HEIDY  Technique: blind insertion  Difficulty: easy    interpretation completed by Cardiology

## 2021-06-10 NOTE — ANESTHESIA PREPROCEDURE EVALUATION
Anesthesia Evaluation      Patient summary reviewed   No history of anesthetic complications     Airway   Mallampati: II  Neck ROM: full   Pulmonary - normal exam   (+) shortness of breath, sleep apnea on CPAP, ,                          Cardiovascular   Exercise tolerance: > or = 4 METS  (+) dysrhythmias, , hypercholesterolemia,     ECG reviewed  Rhythm: irregular  Rate: normal,         Neuro/Psych - negative ROS     Endo/Other    (+) arthritis, obesity,      GI/Hepatic/Renal - negative ROS      Other findings: Hypercholesterolemia     Actinic Keratosis    Shoulder Tendonitis    Cough    Non-Hodgkin's Lymphoma    Abdominal Pain    Paroxysmal atrial fibrillation    JESSIE on CPAP    Marginal zone lymphoma          Dental - normal exam   (+) caps                       Anesthesia Plan  Planned anesthetic: general endotracheal    ASA 3   Induction: intravenous   Anesthetic plan and risks discussed with: patient    Post-op plan: routine recovery

## 2021-06-10 NOTE — PROGRESS NOTES
Pt comes to the clinic for ekg after feeling that he was in afib all weekend.  S/p PVI with Dr. Leonardo 4-19-17, pt continues Eliquis and Aspirin 81 mg, no antiarrythmic medication.  Pt did have significant bradycardia post ablation and metoprolol was discontinued at that time.  Pt has failed Flecainide and Sotalol in the past.    Pt states that about 1.5 weeks post procedure he started having episodes of what felt like afib, they resolved spontaneously after several hours.  These have been increasing in frequency and duration.  He feels that he has been out of rhythm for the last 2 days.  Pt will be leaving for [place] on Wednesday 5-10-17 and returning next week.  EKG shows atrial flutter @79.  Pt states he has feelings of fatigue and decreased energy.    Will review with Dr. Leonardo and call patient with further instructions.    Per Dr. Leonardo,  If patient still in atrial flutter early next week then cardioversion with Sotalol 40 mg PO BID one day prior.  Pt to wear Holter monitor day of CV or day after to assess heart rates.     for return call.      Return call from patient and he is in agreement, orders placed and patient will be called to schedule.

## 2021-06-10 NOTE — ANESTHESIA CARE TRANSFER NOTE
Last vitals:   Vitals:    05/18/17 0816   Pulse: 97   Resp: 16   Temp: 36.6  C (97.9  F)   SpO2: 96%     Patient's level of consciousness is drowsy  Spontaneous respirations: yes  Maintains airway independently: yes  Dentition unchanged: yes  Oropharynx: oropharynx clear of all foreign objects    QCDR Measures:  ASA# 20 - Surgical Safety Checklist: ASA20A - Safety Checks Done  PQRS# 430 - Adult PONV Prevention: NA - Not adult patient, not GA or 3 or more risk factors NOT present  ASA# 8 - Peds PONV Prevention: NA - Not pediatric patient, not GA or 2 or more risk factors NOT present  PQRS# 424 - Archana-op Temp Management: NA - MAC anesthesia or case < 60 minutes  PQRS# 426 - PACU Transfer Protocol:NA - Patient did not go to PACU  ASA# 14 - Acute Post-op Pain: NA - Patient under age 10y or did not go to PACU    I completed my SBAR handoff to the receiving nurse per policy and procedure.

## 2021-06-10 NOTE — ANESTHESIA POSTPROCEDURE EVALUATION
Patient: Rik Zarate  * No procedures listed *  Anesthesia type: general    Patient location:   Last vitals:   Vitals:    05/18/17 1045   BP: 106/72   Pulse: 65   Resp: 19   Temp:    SpO2:      Post vital signs: stable  Level of consciousness: awake and responds to simple questions  Post-anesthesia pain: pain controlled  Post-anesthesia nausea and vomiting: no  Pulmonary: unassisted, return to baseline  Cardiovascular: stable and blood pressure at baseline  Hydration: adequate  Anesthetic events: no    QCDR Measures:  ASA# 11 - Archana-op Cardiac Arrest: ASA11B - Patient did NOT experience unanticipated cardiac arrest  ASA# 12 - Archana-op Mortality Rate: ASA12B - Patient did NOT die  ASA# 13 - PACU Re-Intubation Rate: ASA13B - Patient did NOT require a new airway mgmt  ASA# 10 - Composite Anes Safety: ASA10A - No serious adverse event  ASA# 38 - New Corneal Injury: ASA38A - No new exposure keratitis or corneal abrasion in PACU    Additional Notes:

## 2021-06-10 NOTE — PROGRESS NOTES
Pt was seen in clinic for post op PVI  with Dr. Leonardo on 4-19-17.  Pt VSS, wt stable, LSC, denies SOB, heart rate regular, no bradycardia,  heart sounds S1 and S2 present, and palpable radial and pedal pulses.  No peripheral edema noted, pt does complain of abdominal bloating and discomfort.  Pt denies difficulty swallowing and has no symptoms of heart burn.  Pt denies any neurological changes. Pt denies generalized or localized pain at this time, is tolerating advancement in activity well, staying well hydrated, and has a good appetite and eating well balanced meals.  Pt is having bowel movements and is voiding without difficulty.  Pt had an episode of what felt like afib this am but only lasted for about 1 minute and resolved sponataneously.  Pt states he has some dyspnea with exertion and some mild intermittent dizziness with activity, this is getting better each day.    Pt right groin has 1 puncture site, is C/D/I, no drainage, slight bruising noted around puncture site, 1 suture intact, groin is soft, and pt denies pain at the site.  Left groin has 2 puncture sites, is C/D/I, no drainage, slight amount of bruising around puncture site, has 2 sutures intact, groin is soft, and pt denies pain at the site.  Pt has strong femoral and pedal pulses present.  Pt has healing puncture site present at the left subclavian site, it is C/D/I, no drainage, small amount of bruising around site, and pt denies pain at the site.  Sutures were removed from both the left/right groin sites without complication, and sites were left open to air.      Pt continues anticoagulation, Eliquis, q 12 hours.    Reviewed post op PVI healing process, f/u apts, physical restrictions, nutritional requirements, when to contact EP RN/EP MD, and contact information was given to the pt for further concerns or questions.  Pt verbalized understanding.

## 2021-06-10 NOTE — PROGRESS NOTES
[facility] HEART [facility]   Arrhythmia Clinic    Assessment/Plan:  Diagnoses and all orders for this visit:    Paroxysmal atrial fibrillation and failed flecainide and sotalol prior to ablation.  After ablation is reporting almost daily episodes of atrial fib or flutter and both were documented on EKGs.  I do not believe that he was out of rhythm for a week or more.  He ended up having a cardioversion on 5/18/2016 after starting sotalol 40 mg p.o. twice daily.  24-hour Holter shows average heart rate of 57 on this dose of sotalol.  He has slight lightheadedness at times but short and tolerable.  He has had no episodes of atrial fibrillation or flutter since cardioversion.  To stay on sotalol 40 mg twice daily until he sees Dr. Leonardo back on July 18 as planned.  Will likely take him off of sotalol at that time and after 4-6 weeks off of sotalol will do 2 weeks symptom monitor.  [id number] score of 0 and discussed that he will likely stay on Eliquis  until symptom monitor is back and then expect would switch from Eliquis to aspirin 325 mg 1 tab p.o. daily.  On baby aspirin though it is not on his med list and told to stop.    Atypical atrial flutter and VIKRAM and see if he lines done with PVI.    Typical atrial flutter and treated with PVI.    JESSIE on CPAP and using CPAP routinely.    ______________________________________________________________________    Subjective:    I had the opportunity to see Rik Zarate at the [facility]. Rik Zarate is a 56 y.o. male and postop follow-up after pulmonary vein isolation ablation which was scheduled on April 19.   Rik Zarate has a known history of paroxysmal atrial fib since February 2016. He was also diagnosed with obstructive sleep apnea and is using CPAP routinely now.  Joey tells me that after PVI he started  having episodes of atrial fib within 4-5 days after procedure.  He was doing yard work and probably was working a bit too hard when it first started.   He then was out of rhythm a lot of most days.  He called in and was started on sotalol 40 mg twice daily.  He had cardioversion on 5/18/2016 as demonstrated out of rhythm on 2 EKGs in a row.  I am not clear that he was in A. fib for a week at that time so I have left him labeled as paroxysmal.  Since cardioversion, Joey denies any symptoms of atrial fibrillation.  He is having bradycardia and has been monitoring his heart rates on his phone 3-4 times a day.  He has mild lightheadedness but tolerable and generally with rapid position change.  He is short of breath with more aggressive physical activity.  He has gone back to working out this last week but not as aggressively.  This is the first time he has gone back to the gym since PVI.  He tells me that  ED has been less on sotalol this time but is looking forward to getting off of sotalol.  He has a very stressful job and is working with a Employee Assistance regarding this.    ______________________________________________________________________    Problem List:  Patient Active Problem List   Diagnosis     Hypercholesterolemia     Actinic Keratosis     Shoulder Tendonitis     Cough     Non-Hodgkin's Lymphoma     Abdominal Pain     Paroxysmal atrial fibrillation     JESSIE on CPAP     Marginal zone lymphoma     Status post catheter ablation of atrial fibrillation     Atypical atrial flutter     Typical atrial flutter     Medical History:  Past Medical History:   Diagnosis Date     Atrial fibrillation      Cancer     marginal zone non hodgkins lymphoma     Colitis      High cholesterol      JESSIE on CPAP      Surgical History:  Past Surgical History:   Procedure Laterality Date     CARDIOVERSION  05/18/2017     OTHER SURGICAL HISTORY  04/19/2017    atrial fib ablation     FL EPHYS EVAL W/ABLATION SUPRAVENT ARRHYTHMIA  4/19/2017    Procedure: EP Ablation Atrial Flutter;  Surgeon: Jones Leonardo MD;  Location: [facility] Cath Lab;  Service: Cardiology     FL EPHYS EVL  TRNSPTL TX ATRIAL FIB ISOLAT PULM VEIN Left 4/19/2017    Procedure: EP Ablation PVI;  Surgeon: Jones Leonardo MD;  Location: [facility] Cath Lab;  Service: Cardiology     Social History:  Social History   Substance Use Topics     Smoking status: Former Smoker     Quit date: 1/1/1990     Smokeless tobacco: None      Comment: occasional cigar smoker     Alcohol use 2.4 oz/week     1 Cans of beer, 3 Shots of liquor per week      Comment: weekly        Review of Systems: Review of Systems:   General: WNL  Eyes: WNL  Ears/Nose/Throat: WNL  Lungs: WNL  Heart: WNL  Stomach: WNL  Bladder: WNL  Muscle/Joints: WNL  Skin: WNL  Nervous System: WNL  Mental Health: WNL     Blood: WNL      Family History:  Family History   Problem Relation Age of Onset     Snoring Father      Leukemia Father      Lung cancer Mother      No Medical Problems Sister      Prostate cancer Brother      No Medical Problems Brother      Kidney disease Sister      No Medical Problems Sister          Allergies:  No Known Allergies  Medications:  Current Outpatient Prescriptions   Medication Sig Dispense Refill     apixaban (ELIQUIS) 5 mg Tab tablet Take 1 tablet (5 mg total) by mouth 2 (two) times a day. 60 tablet 3     CALCIUM CITRATE/VITAMIN D3 (CITRACAL REGULAR ORAL) Take 1 tablet by mouth daily.       coQ10, ubiquinol, 100 mg cap Take 1 capsule by mouth daily.       DELZICOL 400 mg delayed-release capsule TAKE 2 CAPSULES BY MOUTH DAILY 180 capsule 2     multivitamin capsule Take 1 capsule by mouth daily.       OMEGA-3/DHA/EPA/FISH OIL (FISH OIL-OMEGA-3 FATTY ACIDS) 300-1,000 mg capsule Take 2 g by mouth daily.       simvastatin (ZOCOR) 20 MG tablet TAKE 1 TABLET BY MOUTH EACH NIGHT AT BEDTIME 90 tablet 2     sotalol (BETAPACE) 80 MG tablet Take 0.5 tablets (40 mg total) by mouth 2 (two) times a day. 30 tablet 3     zolpidem (AMBIEN) 10 mg tablet as needed. 5-10 mg prn  0     No current facility-administered medications for this visit.   "      Objective:   Vital signs:  /70 (Patient Site: Left Arm, Patient Position: Sitting, Cuff Size: Adult Regular)  Pulse (!) 46  Resp 16  Ht 5' 11\" (1.803 m)  Wt 207 lb 9.6 oz (94.2 kg)  BMI 28.95 kg/m2      Physical Exam:    GENERAL APPEARANCE: Alert, cooperative and in no acute distress.  HEENT: No scleral icterus. No Xanthelasma. Oral mucuos membranes pink and moist.  NECK: JVP Nl.   CHEST: clear to auscultation  CARDIOVASCULAR: S1, S2 without murmur ,clicks or rubs. Regular, regular.  Radial and posterior tibial pulses are intact and symetric.   EXTREMITIES: No cyanosis, clubbing or edema.    Results personally reviewed:  May 2017 HEIDY shows EF 55%.  Left atrium normal size.  No thrombus.  Mild MR.  May 18, 2016 24-hour Holter done on sotalol 40 mg twice daily and shows sinus rhythm throughout with relative bradycardia and average ventricular response of 57.  Heart rate range 43-9 0.  No A. fib or flutter.  Reviewed PVI procedure note from 4/19/2017 showing Cryo to 4 pulmonary veins.  VIKRAM, CFE and right CTI flutter ablations also done.   February 2016 echo shows EF 60%. No significant valve disease. Left atrium mildly enlarged  February 2, 2016 12-lead EKG shows atrial fib with controlled ventricular response of 96.  December 2016 nuclear stress test normal.  February 2017 cardiac MR shows:     IMPRESSIONS:    1. There are 4 pulmonary veins with normal configuration. Please see above measurements.  2. Esophagus is adjacent to the posterior side of left inferior pulmonary vein.  3. Left atrium is mildly enlarged.  4. Normal left ventricular size, wall thickness and function. The quantified left ventricular ejection fraction is 66%. No myocardial scar is identified.   5. Normal right ventricular size function.   6. No obvious valvular disease.  No extracardiac findings     Results for orders placed or performed during the hospital encounter of 05/18/17   ECG 12 lead   Result Value Ref Range    SYSTOLIC " BLOOD PRESSURE  mmHg    DIASTOLIC BLOOD PRESSURE  mmHg    VENTRICULAR RATE 52 BPM    ATRIAL RATE 52 BPM    P-R INTERVAL 194 ms    QRS DURATION 80 ms    Q-T INTERVAL 460 ms    QTC CALCULATION (BEZET) 427 ms    P Axis 69 degrees    R AXIS 75 degrees    T AXIS 38 degrees    MUSE DIAGNOSIS       Sinus bradycardia  Normal ECG  When compared with ECG of 17-MAY-2017 16:01,  Sinus rhythm has replaced Atrial fibrillation  Vent. rate has decreased BY  51 BPM  Confirmed by NILSON OVALLE MD LOC:JN ([id number]) on 5/18/2017 3:39:04 PM       TSH:   Lab Results   Component Value Date    TSH 2.60 01/11/2016     BNP: No results found for: BNP  BMP:  Lab Results   Component Value Date    CREATININE 0.93 04/19/2017    BUN 18 04/19/2017     04/19/2017    K 4.2 04/19/2017     04/19/2017    CO2 24 04/19/2017       This note has been dictated using voice recognition software. Any grammatical or context distortions are unintentional and inherent to the software.    MILAGROS RIOS RN, [facility] HEART [facility]  [phone]

## 2021-06-10 NOTE — ANESTHESIA PREPROCEDURE EVALUATION
Anesthesia Evaluation      Patient summary reviewed   No history of anesthetic complications     Airway   Mallampati: II  Neck ROM: full   Pulmonary - normal exam    breath sounds clear to auscultation  (+) sleep apnea on CPAP, , a smoker                         Cardiovascular   Exercise tolerance: > or = 4 METS  (+) dysrhythmias, , hypercholesterolemia,     (-) murmur  ECG reviewed  Rhythm: irregular  Rate: normal,    no murmur   ROS comment:   Left Ventricle: Normal size and systolic function.The calculated left ventricular ejection fraction is 55%    Right Ventricle: Normal size and systolic function.    Left Atrium: Normal size. No left atrial or left atrial appendage thrombus present. No spontaneous echo contrast in the left atrium or left atrial appendage. Patent foramen ovale is not present. No shunts as documented by negative color flow doppler and saline contrast injection. Normal appendage velocities.    Mild mitral regurgitation.    No pericardial effusion.    No complications during transesophageal echocardiogram         Neuro/Psych - negative ROS     Endo/Other - negative ROS      Comments: NHL    GI/Hepatic/Renal - negative ROS           Dental - normal exam   (+) caps                       Anesthesia Plan  Planned anesthetic: general mask    ASA 3   Induction: intravenous   Anesthetic plan and risks discussed with: patient and spouse    Post-op plan: other (karlene)

## 2021-06-10 NOTE — PROGRESS NOTES
Pt comes to clinic to verify rhythm prior to cardioversion.  EKG shows Afib @103.    Pt will plan on cardioversion tomorrow morning 5-18-17.  Pt states understanding.

## 2021-06-11 NOTE — PROGRESS NOTES
[facility] HEART [facility]  Arrhythmia Clinic  Jones Leonardo    Referring:      Assessment:         Paroxysmal atrial fibrillation: The patient is 3 months out from his ablation procedure to treat his atrial fibrillation.  He did have recurrence of an atypical atrial flutter in the first month post ablation.  He was cardioverted and placed on sotalol.  Since that time the patient has had no recurrent tachypalpitations or other symptoms suggesting atrial fibrillation.  He has had no ECG documented recurrence of atrial fibrillation or flutter since that time.  The patient is a candidate to be weaned off his oral antiarrhythmic drug therapy at this time.  I would anticipate leaving the patient on oral anticoagulant therapy over the next 3-4 months to ensure that he does not have recurrence despite his low [id number] risk score.      Recommendations:    Cut sotalol dose to 40 mg every morning for 5 days and then discontinue    14 day REI monitor the first 2 weeks of August.    Follow-up in the A. fib clinic with the EP nurse practitioner and 3 months.  Continue oral Eliquis therapy until seen by the EP nurse practitioner but is asymptomatic and the monitor is negative for recurrent A. fib then discontinue Eliquis and continue low-dose aspirin therapy indefinitely.      Patient Active Problem List   Diagnosis     Hypercholesterolemia     Actinic Keratosis     Shoulder Tendonitis     Cough     Non-Hodgkin's Lymphoma     Abdominal Pain     Paroxysmal atrial fibrillation     JESSIE on CPAP     Marginal zone lymphoma     Status post catheter ablation of atrial fibrillation     Atypical atrial flutter     Typical atrial flutter       Subjective:  Rik Zarate (56 y.o. male) returns to the arrhythmia clinic for interval follow-up of his paroxysmal atrial fibrillation post ablation.  The patient's recovery post ablation was relatively uneventful.  He did have some early recurrent atypical atrial flutter in the first 4  "weeks post ablation.  He did have cardioversion was placed on sotalol therapy.  Since that time the patient has had no further recurrent tachypalpitations or any other signs or symptoms suggesting return of atrial fibrillation or flutter.  He is return to exercise without any significant difficulties.  He has had some intermittent GI discomfort with a \"sharp discomfort which comes on rapidly in the upper abdominal area.  This is improved significantly on H2 blocker therapy.  He notes no exertional chest pain or pressure.  He is not having any exertional dyspnea.  He reports no orthopnea PND or ankle edema.    Current Outpatient Prescriptions   Medication Sig Dispense Refill     CALCIUM CITRATE/VITAMIN D3 (CITRACAL REGULAR ORAL) Take 1 tablet by mouth daily.       coQ10, ubiquinol, 100 mg cap Take 1 capsule by mouth daily.       DELZICOL 400 mg delayed-release capsule TAKE 2 CAPSULES BY MOUTH DAILY 180 capsule 2     ELIQUIS 5 mg Tab tablet TAKE 1 TABLET (5 MG) BY MOUTH TWO TIMES A DAY. 180 tablet 2     multivitamin capsule Take 1 capsule by mouth daily.       OMEGA-3/DHA/EPA/FISH OIL (FISH OIL-OMEGA-3 FATTY ACIDS) 300-1,000 mg capsule Take 2 g by mouth daily.       simvastatin (ZOCOR) 20 MG tablet TAKE 1 TABLET BY MOUTH EACH NIGHT AT BEDTIME 90 tablet 2     zolpidem (AMBIEN) 10 mg tablet as needed. 5-10 mg prn  0     No current facility-administered medications for this visit.        Review of Systems:   General: WNL  Eyes: WNL  Ears/Nose/Throat: WNL  Lungs: WNL  Heart: WNL  Stomach: WNL  Bladder: WNL  Muscle/Joints: WNL  Skin: WNL  Nervous System: WNL  Mental Health: WNL     Blood: WNL    Family History  Family History   Problem Relation Age of Onset     Snoring Father      Leukemia Father      Lung cancer Mother      No Medical Problems Sister      Prostate cancer Brother      No Medical Problems Brother      Kidney disease Sister      No Medical Problems Sister        Social History   reports that he quit smoking " "about 27 years ago. He does not have any smokeless tobacco history on file. He reports that he drinks about 2.4 oz of alcohol per week  He reports that he does not use illicit drugs.    Objective:   Vital signs in last 24 hours:  /70 (Patient Site: Left Arm, Patient Position: Sitting, Cuff Size: Adult Large)  Pulse 68  Resp 18  Ht 5' 11\" (1.803 m)  Wt 208 lb (94.3 kg)  BMI 29.01 kg/m2  Weight: Weight: 208 lb (94.3 kg)     Physical Exam:  General: The patient is alert oriented to person place and situation.  The patient is in no acute distress at the time of my evaluation.  Eyes: Pupils are equal, round, and reactive to light.  Conjunctiva and sclera are clear.  ENT: Oral mucosa is moist and without redness. No evident nasal discharge.  Pulmonary: Lungs are clear bilaterally with no rales, rhonchi, or wheezes.    Cardiovascular exam: Rhythm is regular. S1 and S2 are normal. No significant murmur is present. JVP is normal. Lower extremities demonstrate no significant edema. Distal pulses are intact bilaterally.  Abdomen is flat, soft, and nontender.  Musculoskeletal: Spine is straight. Extremities without deformity.  Neuro: Gait is normal.   Skin is warm, dry, and otherwise intact.      Cardiographics:       Lab Results:   Lab Results   Component Value Date     04/19/2017    K 4.2 04/19/2017     04/19/2017    CO2 24 04/19/2017    BUN 18 04/19/2017    CREATININE 0.93 04/19/2017    CALCIUM 8.8 04/19/2017     Lab Results   Component Value Date    WBC 15.4 (H) 04/19/2017    HGB 17.1 04/19/2017    HCT 51.1 04/19/2017    MCV 96 04/19/2017     04/19/2017     No results found for: INR      Outside record review:        "

## 2021-06-13 NOTE — TELEPHONE ENCOUNTER
Rx refill encounter created through [place] chart, and routed to Dr. Keller and Dr. Pickens ( In office 12/14/20)

## 2021-06-13 NOTE — PROGRESS NOTES
"[facility]   Arrhythmia Clinic    Assessment/Plan:  Diagnoses and all orders for this visit:    Paroxysmal atrial fibrillation, Atypical atrial flutter and Typical atrial flutter and all these arrhythmias were treated at the time of his ablation in April 2017.  Joey initially had reoccurrence and his symptoms were the same as they were before ablation.  He has not had any symptoms of reoccurrence since the cardioversion on May 18 of 2017.  He was taken off of sotalol in July when he saw Dr. Leonardo.  He feels better off medication.  He is noting his baseline heart rates are faster than normal.  He is glad he did ablation.  He had a very aggressive start to his atrial fib.  [id number] score of 0 and now 6 months post ablation with no symptoms of reoccurrence since he is off of sotalol for greater than 4 months now.  To stop Eliquis and start aspirin 325 mg 1 tablet orally every day.  To follow-up with Dr. Leonardo in 6 months which will be at his one-year anniversary.  To continue pulse checks 2-3 times a week as discussed that there is chances of recurrence especially in the first 2 years post ablation.  -     aspirin 325 MG EC tablet; Take 1 tablet (325 mg total) by mouth daily.; Refill: 0    JESSIE on CPAP and consistently uses CPAP with 1005 compliance.  He  feels like he has a \"hangover \"if he does not use CPAP.  Discussed the use of CPAP will be important long-term to avoid reoccurrence of arrhythmias even years post ablation.    Other orders  -     loratadine (CLARITIN) 10 mg tablet; Take 10 mg by mouth daily.    _____________________________________________________________________    Subjective:    I had the opportunity to see Rik Zarate at the [facility]. Rik Zarate is a 56 y.o. male and here for routine EP follow-up for paroxysmal atrial fib, typical and atypical atrial flutter.    Rik Zarate has a known history of paroxysmal atrial fib since February 2016 and had very aggressive " progression of his atrial fib to where he was frequently out of rhythm.  On April 19, 2017 he had PVI with Dr. Leonardo with cryoablation to 4 pulmonary veins.  He also had right CTI flutter line and VIKRAM and CFE lines done.  He had one cardioversion after ablation and was put back on very low dose sotalol because he had reoccurrence of atrial fibrillation after.  Sotalol was discontinued in July 2017.  Joey is directly symptomatic with atrial arrhythmias and had same symptoms post ablation.  He was also diagnosed with obstructive sleep apnea and is using CPAP routinely now.  He denies any symptoms of recurrence of atrial arrhythmias since cardioversion.  He denies any cardiac symptoms.  He denies any change in energy level or tolerance of activity.  He remains active working out in the gym.  I discussed today that he is not under any activity restrictions.  ______________________________________________________________________    Problem List:  Patient Active Problem List   Diagnosis     Hypercholesterolemia     Actinic Keratosis     Shoulder Tendonitis     Cough     Non-Hodgkin's Lymphoma     Abdominal Pain     Paroxysmal atrial fibrillation     JESSIE on CPAP     Marginal zone lymphoma     Status post catheter ablation of atrial fibrillation     Atypical atrial flutter     Typical atrial flutter     Medical History:  Past Medical History:   Diagnosis Date     Atrial fibrillation      Cancer     marginal zone non hodgkins lymphoma     Colitis      High cholesterol      JESSIE on CPAP      Surgical History:  Past Surgical History:   Procedure Laterality Date     CARDIOVERSION  05/18/2017     OTHER SURGICAL HISTORY  04/19/2017    atrial fib ablation     TN EPHYS EVAL W/ABLATION SUPRAVENT ARRHYTHMIA  4/19/2017    Procedure: EP Ablation Atrial Flutter;  Surgeon: Jones Leonardo MD;  Location: [facility];  Service: Cardiology     TN EPHYS EVL TRNSPTL TX ATRIAL FIB ISOLAT PULM VEIN Left 4/19/2017    Procedure: EP  Ablation PVI;  Surgeon: Jones Leonardo MD;  Location: [facility];  Service: Cardiology     Social History:  Social History   Substance Use Topics     Smoking status: Former Smoker     Quit date: 1/1/1990     Smokeless tobacco: None      Comment: occasional cigar smoker     Alcohol use 2.4 oz/week     1 Cans of beer, 3 Shots of liquor per week      Comment: weekly        Review of Systems: Review of Systems:   General: WNL  Eyes: WNL  Ears/Nose/Throat: WNL  Lungs: WNL  Heart: WNL  Stomach: WNL  Bladder: WNL  Muscle/Joints: WNL  Skin: WNL  Nervous System: WNL  Mental Health: WNL     Blood: WNL      Family History:  Family History   Problem Relation Age of Onset     Snoring Father      Leukemia Father      Lung cancer Mother      No Medical Problems Sister      Prostate cancer Brother      No Medical Problems Brother      Kidney disease Sister      No Medical Problems Sister          Allergies:  No Known Allergies  Medications:  Current Outpatient Prescriptions   Medication Sig Dispense Refill     CALCIUM CITRATE/VITAMIN D3 (CITRACAL REGULAR ORAL) Take 1 tablet by mouth daily.       coQ10, ubiquinol, 100 mg cap Take 1 capsule by mouth daily.       DELZICOL 400 mg delayed-release capsule TAKE 2 CAPSULES BY MOUTH DAILY 180 capsule 2     loratadine (CLARITIN) 10 mg tablet Take 10 mg by mouth daily.       multivitamin capsule Take 1 capsule by mouth daily.       OMEGA-3/DHA/EPA/FISH OIL (FISH OIL-OMEGA-3 FATTY ACIDS) 300-1,000 mg capsule Take 2 g by mouth daily.       simvastatin (ZOCOR) 20 MG tablet TAKE 1 TABLET BY MOUTH EACH NIGHT AT BEDTIME 90 tablet 2     zolpidem (AMBIEN) 10 mg tablet Take 0.5 tablets (5 mg total) by mouth at bedtime as needed for sleep. 15 tablet 3     aspirin 325 MG EC tablet Take 1 tablet (325 mg total) by mouth daily.  0     No current facility-administered medications for this visit.        Objective:   Vital signs:  /70 (Patient Site: Left Arm, Patient Position: Sitting, Cuff  "Size: Adult Large)  Pulse 76  Resp 18  Ht 5' 11\" (1.803 m)  Wt 212 lb (96.2 kg)  BMI 29.57 kg/m2      Physical Exam:    GENERAL APPEARANCE: Alert, cooperative and in no acute distress.  HEENT: No scleral icterus. No Xanthelasma. Oral mucuos membranes pink and moist.  NECK: JVP Nl.   CHEST: clear to auscultation  CARDIOVASCULAR: S1, S2 without murmur ,clicks or rubs. Regular, regular.  Radial and posterior tibial pulses are intact and symetric.   EXTREMITIES: No cyanosis, clubbing or edema.    Results personally reviewed:  Results for orders placed during the hospital encounter of 04/19/17   Echo HEIDY W Bubble [ECH12] 04/19/2017    Narrative   Left Ventricle: Normal size and systolic function.The calculated left   ventricular ejection fraction is 55%    Right Ventricle: Normal size and systolic function.    Left Atrium: Normal size. No left atrial or left atrial appendage   thrombus present. No spontaneous echo contrast in the left atrium or left   atrial appendage. Patent foramen ovale is not present. No shunts as   documented by negative color flow doppler and saline contrast injection.   Normal appendage velocities.    Mild mitral regurgitation.    No pericardial effusion.    No complications during transesophageal echocardiogram      May 18, 2016 24-hour Holter done on sotalol 40 mg twice daily and shows sinus rhythm throughout with relative bradycardia and average ventricular response of 57.  Heart rate range 43-9 0.  No A. fib or flutter.  Reviewed PVI procedure note from 4/19/2017 showing Cryo to 4 pulmonary veins.  VIKRAM, CFE and right CTI flutter ablations also done.   February 2016 echo shows EF 60%. No significant valve disease. Left atrium mildly enlarged  February 2, 2016 12-lead EKG shows atrial fib with controlled ventricular response of 96.  December 2016 nuclear stress test normal.  February 2017 cardiac MR shows:      IMPRESSIONS:    1. There are 4 pulmonary veins with normal configuration. Please " see above measurements.  2. Esophagus is adjacent to the posterior side of left inferior pulmonary vein.  3. Left atrium is mildly enlarged.  4. Normal left ventricular size, wall thickness and function. The quantified left ventricular ejection fraction is 66%. No myocardial scar is identified.   5. Normal right ventricular size function.   6. No obvious valvular disease.  No extracardiac findings           Results for orders placed or performed during the hospital encounter of 05/18/17   ECG 12 lead   Result Value Ref Range    SYSTOLIC BLOOD PRESSURE  mmHg    DIASTOLIC BLOOD PRESSURE  mmHg    VENTRICULAR RATE 52 BPM    ATRIAL RATE 52 BPM    P-R INTERVAL 194 ms    QRS DURATION 80 ms    Q-T INTERVAL 460 ms    QTC CALCULATION (BEZET) 427 ms    P Axis 69 degrees    R AXIS 75 degrees    T AXIS 38 degrees    MUSE DIAGNOSIS       Sinus bradycardia  Normal ECG  When compared with ECG of 17-MAY-2017 16:01,  Sinus rhythm has replaced Atrial fibrillation  Vent. rate has decreased BY  51 BPM  Confirmed by NILSON OVALLE MD LOC:JN ([id number]) on 5/18/2017 3:39:04 PM         TSH:   Lab Results   Component Value Date    TSH 2.60 01/11/2016     BNP: No results found for: BNP  BMP:  Lab Results   Component Value Date    CREATININE 0.93 04/19/2017    BUN 18 04/19/2017     04/19/2017    K 4.2 04/19/2017     04/19/2017    CO2 24 04/19/2017       This note has been dictated using voice recognition software. Any grammatical or context distortions are unintentional and inherent to the software.    MILAGROS RIOS RN, [facility]  [phone]

## 2021-06-15 PROBLEM — Z98.890 STATUS POST CATHETER ABLATION OF ATRIAL FIBRILLATION: Status: ACTIVE | Noted: 2017-04-19

## 2021-06-15 NOTE — TELEPHONE ENCOUNTER
Refill Approved    Rx renewed per Medication Renewal Policy. Medication was last renewed on 2/8/20.    Buster Samuels, [facility] Triage/Med Refill 3/11/2021     Requested Prescriptions   Pending Prescriptions Disp Refills     simvastatin (ZOCOR) 20 MG tablet [Pharmacy Med Name: SIMVASTATIN 20MG TABLET** 20 Tablet] 90 tablet 3     Sig: TAKE 1 TABLET BY MOUTH EACH NIGHT AT BEDTIME       Statins Refill Protocol (Hmg CoA Reductase Inhibitors) Passed - 3/11/2021 10:09 AM        Passed - PCP or prescribing provider visit in past 12 months      Last office visit with prescriber/PCP: 3/19/2018 Ryan Hernandez MD OR same dept: Visit date not found OR same specialty: 8/14/2018 Hossein Rosario MD  Last physical: 12/10/2018 Last MTM visit: Visit date not found   Next visit within 3 mo: Visit date not found  Next physical within 3 mo: Visit date not found  Prescriber OR PCP: Ryan Hernandez MD  Last diagnosis associated with med order: 1. Pure hypercholesterolemia  - simvastatin (ZOCOR) 20 MG tablet [Pharmacy Med Name: SIMVASTATIN 20MG TABLET** 20 Tablet]; TAKE 1 TABLET BY MOUTH EACH NIGHT AT BEDTIME  Dispense: 90 tablet; Refill: 3    2. History of colitis  - mesalamine (DELZICOL) 400 mg delayed-release capsule [Pharmacy Med Name: MESALAMINE 400 MG CPDR 400 Capsule]; TAKE 2 CAPSULES (800 MG TOTAL) BY MOUTH DAILY.  Dispense: 180 capsule; Refill: 3    If protocol passes may refill for 12 months if within 3 months of last provider visit (or a total of 15 months).                mesalamine (DELZICOL) 400 mg delayed-release capsule [Pharmacy Med Name: MESALAMINE 400 MG CPDR 400 Capsule] 180 capsule 3     Sig: TAKE 2 CAPSULES (800 MG TOTAL) BY MOUTH DAILY.       Mesalamine Refill Protocol for Crohns Failed - 3/11/2021 10:09 AM        Failed - LFT or AST or ALT in last year     Albumin   Date Value Ref Range Status   12/10/2018 4.1 3.5 - 5.0 g/dL Final     Bilirubin, Total   Date Value Ref Range Status   12/10/2018 0.4 0.0 - 1.0  mg/dL Final     Alkaline Phosphatase   Date Value Ref Range Status   12/10/2018 72 45 - 120 U/L Final     AST   Date Value Ref Range Status   12/10/2018 28 0 - 40 U/L Final     ALT   Date Value Ref Range Status   12/10/2018 33 0 - 45 U/L Final     Protein, Total   Date Value Ref Range Status   12/10/2018 7.2 6.0 - 8.0 g/dL Final                Failed - Visit with PCP or prescribing provider visit in last 6 months or next 3 months     Last office visit with prescriber/PCP: Visit date not found OR same dept: Visit date not found OR same specialty: 8/14/2018 Hossein Rosario MD Last physical: Visit date not found Last MTM visit: Visit date not found     Next appt within 3 mo: Visit date not found  Next physical within 3 mo: Visit date not found  Prescriber OR PCP: Ryan Hernandez MD  Last diagnosis associated with med order: 1. Pure hypercholesterolemia  - simvastatin (ZOCOR) 20 MG tablet [Pharmacy Med Name: SIMVASTATIN 20MG TABLET** 20 Tablet]; TAKE 1 TABLET BY MOUTH EACH NIGHT AT BEDTIME  Dispense: 90 tablet; Refill: 3    2. History of colitis  - mesalamine (DELZICOL) 400 mg delayed-release capsule [Pharmacy Med Name: MESALAMINE 400 MG CPDR 400 Capsule]; TAKE 2 CAPSULES (800 MG TOTAL) BY MOUTH DAILY.  Dispense: 180 capsule; Refill: 3    If protocol passes may refill for 6 months if within 3 months of last provider visit (or a total of 9 months).              Failed - CBC (Hemogram 2) in last year      WBC   Date Value Ref Range Status   08/14/2018 14.2 (H) 4.0 - 11.0 thou/uL Final     RBC   Date Value Ref Range Status   08/14/2018 4.92 4.40 - 6.20 mill/uL Final     Hemoglobin   Date Value Ref Range Status   08/14/2018 15.8 14.0 - 18.0 g/dL Final     Hematocrit   Date Value Ref Range Status   08/14/2018 47.2 40.0 - 54.0 % Final     MCV   Date Value Ref Range Status   08/14/2018 96 80 - 100 fL Final     MCH   Date Value Ref Range Status   08/14/2018 32.1 27.0 - 34.0 pg Final     MCHC   Date Value Ref Range Status    08/14/2018 33.4 32.0 - 36.0 g/dL Final     RDW   Date Value Ref Range Status   08/14/2018 12.3 11.0 - 14.5 % Final     Platelets   Date Value Ref Range Status   08/14/2018 237 140 - 440 thou/uL Final     MPV   Date Value Ref Range Status   08/14/2018 8.3 7.0 - 10.0 fL Final                Failed - Serum Creatinine in the last year     Creatinine   Date Value Ref Range Status   12/10/2018 0.87 0.70 - 1.30 mg/dL Final

## 2021-06-15 NOTE — PATIENT INSTRUCTIONS - HE
Rik Zarate,    It was a pleasure to see you today at the [facility].     My recommendations after this visit include:    Please call if symptoms of atrial fibrillation.      To followup with me or Dr. Leonardo as needed.      My contact information:  Sunny Henry CNP  After Hours or Scheduling  [phone]  My Nurse---Rachel Valdez [phone]

## 2021-06-15 NOTE — TELEPHONE ENCOUNTER
RN cannot approve Refill Request    RN can NOT refill this medication Protocol failed and NO refill given. Last office visit: 3/19/2018 Ryan Hernandez MD Last Physical: 12/10/2018 Last MTM visit: Visit date not found Last visit same specialty: 8/14/2018 Hosseni Rosario MD.  Next visit within 3 mo: Visit date not found  Next physical within 3 mo: Visit date not found      Buster MATTSONOdell Samuels, [facility] Triage/Med Refill 3/11/2021    Requested Prescriptions   Pending Prescriptions Disp Refills     mesalamine (DELZICOL) 400 mg delayed-release capsule [Pharmacy Med Name: MESALAMINE 400 MG CPDR 400 Capsule] 180 capsule 3     Sig: TAKE 2 CAPSULES (800 MG TOTAL) BY MOUTH DAILY.       Mesalamine Refill Protocol for Crohns Failed - 3/11/2021 10:09 AM        Failed - LFT or AST or ALT in last year     Albumin   Date Value Ref Range Status   12/10/2018 4.1 3.5 - 5.0 g/dL Final     Bilirubin, Total   Date Value Ref Range Status   12/10/2018 0.4 0.0 - 1.0 mg/dL Final     Alkaline Phosphatase   Date Value Ref Range Status   12/10/2018 72 45 - 120 U/L Final     AST   Date Value Ref Range Status   12/10/2018 28 0 - 40 U/L Final     ALT   Date Value Ref Range Status   12/10/2018 33 0 - 45 U/L Final     Protein, Total   Date Value Ref Range Status   12/10/2018 7.2 6.0 - 8.0 g/dL Final                Failed - Visit with PCP or prescribing provider visit in last 6 months or next 3 months     Last office visit with prescriber/PCP: Visit date not found OR same dept: Visit date not found OR same specialty: 8/14/2018 Hossein Rosario MD Last physical: Visit date not found Last MTM visit: Visit date not found     Next appt within 3 mo: Visit date not found  Next physical within 3 mo: Visit date not found  Prescriber OR PCP: Ryan Hernandez MD  Last diagnosis associated with med order: 1. Pure hypercholesterolemia  - simvastatin (ZOCOR) 20 MG tablet; TAKE 1 TABLET BY MOUTH EACH NIGHT AT BEDTIME  Dispense: 90 tablet; Refill: 1    2. History of  colitis  - mesalamine (DELZICOL) 400 mg delayed-release capsule [Pharmacy Med Name: MESALAMINE 400 MG CPDR 400 Capsule]; TAKE 2 CAPSULES (800 MG TOTAL) BY MOUTH DAILY.  Dispense: 180 capsule; Refill: 3    If protocol passes may refill for 6 months if within 3 months of last provider visit (or a total of 9 months).              Failed - CBC (Hemogram 2) in last year      WBC   Date Value Ref Range Status   08/14/2018 14.2 (H) 4.0 - 11.0 thou/uL Final     RBC   Date Value Ref Range Status   08/14/2018 4.92 4.40 - 6.20 mill/uL Final     Hemoglobin   Date Value Ref Range Status   08/14/2018 15.8 14.0 - 18.0 g/dL Final     Hematocrit   Date Value Ref Range Status   08/14/2018 47.2 40.0 - 54.0 % Final     MCV   Date Value Ref Range Status   08/14/2018 96 80 - 100 fL Final     MCH   Date Value Ref Range Status   08/14/2018 32.1 27.0 - 34.0 pg Final     MCHC   Date Value Ref Range Status   08/14/2018 33.4 32.0 - 36.0 g/dL Final     RDW   Date Value Ref Range Status   08/14/2018 12.3 11.0 - 14.5 % Final     Platelets   Date Value Ref Range Status   08/14/2018 237 140 - 440 thou/uL Final     MPV   Date Value Ref Range Status   08/14/2018 8.3 7.0 - 10.0 fL Final                Failed - Serum Creatinine in the last year     Creatinine   Date Value Ref Range Status   12/10/2018 0.87 0.70 - 1.30 mg/dL Final              Signed Prescriptions Disp Refills    simvastatin (ZOCOR) 20 MG tablet 90 tablet 1     Sig: TAKE 1 TABLET BY MOUTH EACH NIGHT AT BEDTIME       Statins Refill Protocol (Hmg CoA Reductase Inhibitors) Passed - 3/11/2021 10:09 AM        Passed - PCP or prescribing provider visit in past 12 months      Last office visit with prescriber/PCP: 3/19/2018 Ryan Hernandez MD OR same dept: Visit date not found OR same specialty: 8/14/2018 Hossein Rosario MD  Last physical: 12/10/2018 Last MTM visit: Visit date not found   Next visit within 3 mo: Visit date not found  Next physical within 3 mo: Visit date not found  Prescriber  OR PCP: Ryan Hernandez MD  Last diagnosis associated with med order: 1. Pure hypercholesterolemia  - simvastatin (ZOCOR) 20 MG tablet; TAKE 1 TABLET BY MOUTH EACH NIGHT AT BEDTIME  Dispense: 90 tablet; Refill: 1    2. History of colitis  - mesalamine (DELZICOL) 400 mg delayed-release capsule [Pharmacy Med Name: MESALAMINE 400 MG CPDR 400 Capsule]; TAKE 2 CAPSULES (800 MG TOTAL) BY MOUTH DAILY.  Dispense: 180 capsule; Refill: 3    If protocol passes may refill for 12 months if within 3 months of last provider visit (or a total of 15 months).

## 2021-06-16 PROBLEM — M54.12 CERVICAL RADICULOPATHY: Status: ACTIVE | Noted: 2020-07-02

## 2021-06-16 PROBLEM — Z87.19 HISTORY OF COLITIS: Status: ACTIVE | Noted: 2018-03-19

## 2021-06-16 NOTE — PROGRESS NOTES
[facility] Follow Up Note    iRk Zarate   57 y.o. male    Date of Visit: 3/19/2018    Chief Complaint   Patient presents with     Follow-up     med check     Subjective  Joey is here for follow-up of multiple medical issues.  Last saw me November 2016.    He has a past history of sleep apnea compliant with CPAP, was seen at the sleep clinic August 2017 and was felt to be doing okay given a one-year follow-up.  He takes Ambien 5 mg every evening and is been doing so for many years.    He has had moderate alcohol in the past but states he is drinking much less recently.    He still is a restless night sleep often.  Can often wake up to 5 times at night, generally only urinates 1 time a night.  He denies restless legs.  He has minimal daytime sleepiness.    No history of hypertension.    He has had paroxysmal atrial fibrillation and had an ablation in April 2017.  He was cardioverted in May and has not had recurrence since then.  Was taken off sotalol in July.  Saw cardiology in October, no longer on Eliquis, just on a low-dose aspirin.  No history of TIA or stroke symptoms.    No palpitations since last spring.    No increasing shortness of breath with exertion or chest pain.    He still goes to the gym and does the elliptical machine and treadmill.    Cardiac MRI February 2017 with normal ejection fraction of 66%.    He has not had lower extremity edema or increasing shortness of breath or orthopnea.    No new muscle aches or right upper quadrant pain or jaundice on the simvastatin 20 mg a day.    November 2016  and HDL 37.    No new cough.  He quit smoking over 17 years ago, but occasionally has a cigar in the summer.  His last cigar was last November.    No family history of heart disease or diabetes.    2014 ultrasound negative AAA.    Some chronic neck pain in the past, no complaints today.    Denies any urinary symptoms.  Family history positive for prostate cancer in his  "brother.  2016 PSA was 1.4.    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  Bone marrow biopsy .  He saw hematology 2018 with hemogram results reviewed.  He was felt to have no evidence of progression and six-month follow-up given.  He has not had any night sweats or noticeable lymphadenopathy.  No new cough.    He also had labs done earlier this week at hematology with a creatinine of 0.9 and potassium 4.1 and normal liver test.    Distant history of left eye retinal hemorrhage in .  He saw the eye doctor last year, no vision changes.  Given a one-year follow-up.    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  His father-in-law, Federico Deutsch  last winter.    PMHx:    Past Medical History:   Diagnosis Date     Atrial fibrillation      Cancer     marginal zone non hodgkins lymphoma     Colitis      High cholesterol      JESSIE on CPAP      PSHx:    Past Surgical History:   Procedure Laterality Date     CARDIOVERSION  2017     OTHER SURGICAL HISTORY  2017    atrial fib ablation     LA EPHYS EVAL W/ABLATION SUPRAVENT ARRHYTHMIA  2017    Procedure: EP Ablation Atrial Flutter;  Surgeon: Jones Leonardo MD;  Location: [facility];  Service: Cardiology     LA EPHYS EVL TRNSPTL TX ATRIAL FIB ISOLAT PULM VEIN Left 2017    Procedure: EP Ablation PVI;  Surgeon: Jones Leonardo MD;  Location: [facility];  Service: Cardiology     Immunizations:   Immunization History   Administered Date(s) Administered     Td,adult,historic,unspecified 1999       ROS A comprehensive review of systems was performed and was otherwise negative    Medications, allergies, and problem list were reviewed and updated    Exam  /68  Pulse 73  Ht 5' 11\" (1.803 m)  Wt 219 lb (99.3 kg)  SpO2 96%  BMI 30.54 kg/m2  Alert and oriented ×3.  Moderately overweight.  Normal mood and affect.  Pupils and irises equal and " reactive and no jaundice.  Pharynx is within normal limits, just mildly crowded.  No exudate.  Teeth in good condition.  No cervical or supraclavicular adenopathy.  No JVD and no carotid bruits.  Lungs clear to auscultation with normal respiratory excursion.  Heart is regular with no ectopy.  No murmur or gallop.  No ankle edema.  Abdomen is mild to moderately overweight, no hepatomegaly.    Assessment/Plan  1. Paroxysmal atrial fibrillation, post ablation April 2017  No evidence of recurrence since last spring.  One-year cardiology follow-up in October.    Aspirin daily.    2. Pure hypercholesterolemia  Has been well-controlled.  Goal LDL less than 130.  Also has low HDL.    He will return this summer for a physical exam with fasting labs.    His liver tests were normal earlier this month at hematology.  - simvastatin (ZOCOR) 20 MG tablet; TAKE 1 TABLET BY MOUTH EACH NIGHT AT BEDTIME  Dispense: 90 tablet; Refill: 3    3. JESSIE on CPAP  CPAP working well, but some restlessness at night.  He may have some restless leg syndrome.  Encouraged him to discuss this with his sleep clinic, he could consider another sleep study with CPAP to ensure its maximally effective.    He does wish to continue on the Ambien as he has been on that for many years.  I did discuss possible association with alcohol and restlessness at night, but he states he has been cutting down.    Continue regular exercise at the gym and ambulation during the day.  - Ambulatory referral to Sleep Medicine    4. Marginal zone lymphoma  No evidence of progression on hematology evaluation earlier this month.  Six-month follow-up with hematology.    5. History of colitis  Past history of diverticular associated bleeding with diverticular associated colitis.  He has not had any recurrence of this for many years.  He does not want to see gastroenterology again at this time as long as things are well controlled.  He denies any constipation.    He denies family  history of colon cancer, that was clarified from previous notes.  No history of polyp.  I reviewed the colonoscopy from July 2009 and no polyp.  10 year follow-up plan, which would be next year.    Continue the mesalamine daily.  I did give him the option of trying off of that, but with risk of recurrent colitis and bleeding.  - mesalamine (DELZICOL) 400 mg delayed-release capsule; Take 2 capsules (800 mg total) by mouth daily.  Dispense: 180 capsule; Refill: 3    6. Medication monitoring encounter  Labs are reviewed from earlier this month at hematology    7. Restlessness  As above  - Ambulatory referral to Sleep Medicine    8. Hearing reduced, left  I did examine his ear canals, he has moderate cerumen on the left, just slight on the right, but he feels it is affecting his hearing and he was not able to remove it on his own.  Requesting referral for ENT to remove earwax  - Ambulatory referral to ENT    9. Impacted cerumen of left ear    - Ambulatory referral to ENT    He still declines a tetanus shot, but I did suggest he get that at a local pharmacy.    Continue yearly eye exams, and was seen last year.  History of left eye retinal hemorrhage.    No complaints of worsening neck pain with history of cervical stenosis.    We will plan to check PSA this summer at his physical.    I again told him not to smoke any cigars or use any tobacco.    He had some mild bilateral elbow tendinitis consistent with tendinitis.  Avoid strain activity, follow-up if that worsens.     Return in about 4 months (around 7/19/2018) for Annual physical.   Patient Instructions   See me this summer in approximately 3-4 months for a full physical.  Plan fasting labs at that time.    See  for referral back to the sleep clinic, and ENT visit for the earwax in the left ear.    No medication changes, refill sent in.    Your 10 year colonoscopy will be due July 2019.    Ryan Hernandez MD  Total time with patient over 25 minutes and over  50% coord care.  Time all face to face.  The following high BMI interventions were performed this visit: encouragement to exercise    Current Outpatient Prescriptions   Medication Sig Dispense Refill     aspirin 325 MG EC tablet Take 1 tablet (325 mg total) by mouth daily.  0     CALCIUM CITRATE/VITAMIN D3 (CITRACAL REGULAR ORAL) Take 1 tablet by mouth daily.       coQ10, ubiquinol, 100 mg cap Take 1 capsule by mouth daily.       loratadine (CLARITIN) 10 mg tablet Take 10 mg by mouth daily.       mesalamine (DELZICOL) 400 mg delayed-release capsule Take 2 capsules (800 mg total) by mouth daily. 180 capsule 3     multivitamin capsule Take 1 capsule by mouth daily.       OMEGA-3/DHA/EPA/FISH OIL (FISH OIL-OMEGA-3 FATTY ACIDS) 300-1,000 mg capsule Take 2 g by mouth daily.       simvastatin (ZOCOR) 20 MG tablet TAKE 1 TABLET BY MOUTH EACH NIGHT AT BEDTIME 90 tablet 3     zolpidem (AMBIEN) 10 mg tablet TAKE 1/2 TABLET (5 MG TOTAL) BY MOUTH AT BEDTIME AS NEEDED FOR SLEEP. 15 tablet 4     No current facility-administered medications for this visit.      No Known Allergies  Social History   Substance Use Topics     Smoking status: Former Smoker     Quit date: 1/1/1990     Smokeless tobacco: Former User      Comment: occasional cigar smoker     Alcohol use 2.4 oz/week     1 Cans of beer, 3 Shots of liquor per week      Comment: weekly

## 2021-06-19 NOTE — LETTER
Letter by Ryan Hernandez MD at      Author: Ryan Hernandez MD Service: -- Author Type: --    Filed:  Encounter Date: 8/21/2019 Status: (Other)         Rik Zarate  [address] [place] [address]             August 21, 2019         Dear Mr. Zarate,    Our records show that you are due for a colonoscopy.  We do want to inform you that there are a couple of other options besides the traditional colonoscopy that we offer.  If you would like to do the traditional colonoscopy, please contact a [facility] near you according to the card enclosed.  If you would like to do one of the other options available to you, please let you primary doctor know and they will get that ordered.  Enclosed is some information on the other options for you to read over.  If you have had any of these done at another facility, please arrange for us to receive those records so we can update your chart.    Please call with questions or contact us using [employer].    Sincerely,        Electronically signed by Ryan Hernandez MD

## 2021-06-19 NOTE — PROGRESS NOTES
Office Visit - Follow Up   Rik Zarate   57 y.o. male    Date of Visit: 8/14/2018    Chief Complaint   Patient presents with     GI Problem     x 1 wk, Sxs: abd cramping - hx of colitis - taking mesalamine 800 mg in the morning         Assessment and Plan   1. Abdominal pain, epigastric  We discussed the differential at length today.  He has a lot of things going on and a lot of potential etiologies here.  Because of its location will check labs as below.  Begin omeprazole 20 mg daily.  Given his history of colitis and lymphoma will check CT abdomen pelvis.  He is to follow up quickly with Dr. Wilson.  He was given instructions to be seen immediately if any blood or fevers at [facility].  - Comprehensive Metabolic Panel  - HM2(CBC w/o Differential)  - Lipase  - Urinalysis-UC if Indicated  - CT Abdomen Pelvis With Oral Without IV Contrast; Future    2. Colitis  I do question whether patient should have colonoscopy sooner than 10 years given his history of colitis.  He was told by gastroenterology that he does not need one sooner than that.  I like him to readdress this with Dr. Wilson at his next visit.    3. Marginal zone lymphoma (H)  Follows closely with Dr. Rosas.  No recent imaging however.  Await labs and go from there.        Return in about 1 week (around 8/21/2018) for Recheck.     History of Present Illness   This 57 y.o. old complicated patient of Dr. Wilson's who comes in today as an urgent add-on for evaluation of abdominal pain.  He has a history of lymphoma as well as diverticular disease as well as colitis for which she is on mesalamine.  She has not had follow-up with gastroenterology or colonoscopy in many years.  Apparently things have been stable.  When he comes off mesalamine he gets bloody diarrhea.  He is been taking his mesalamine faithfully.  He is active.  He is very busy with work and has a stressful job in sales.  The last week he has been having abdominal cramping in his midepigastric and  "mid abdomen.  Occasionally associated with some diarrhea.  No blood or fever.  No nausea or vomiting.  Different types of food did not seem to make it worse and alcohol he did not believe made it worse.  He is feeling a little better today.  He has not tried any medicines for this.  No weight loss.    Review of Systems: A comprehensive review of systems was negative except as noted.     Medications, Allergies and Problem List   Reviewed and updated     Physical Exam   General Appearance:   Pleasant gentleman.  Does not appear acutely ill.  Tanned.  Abdomen is obese soft nontender with normoactive bowel sounds.  He has a easily reducible umbilical hernia.  No hepatosplenomegaly noted although difficult to assess due to his obesity.    /70 (Patient Site: Right Arm, Patient Position: Sitting, Cuff Size: Adult Regular)  Pulse 70  Temp 98.6  F (37  C)  Ht 5' 11\" (1.803 m)  Wt 218 lb (98.9 kg)  SpO2 97%  BMI 30.4 kg/m2         Additional Information   Current Outpatient Prescriptions   Medication Sig Dispense Refill     aspirin 81 MG EC tablet Take 1 tablet (81 mg total) by mouth daily.       CALCIUM CITRATE/VITAMIN D3 (CITRACAL REGULAR ORAL) Take 1 tablet by mouth daily.       coQ10, ubiquinol, 100 mg cap Take 1 capsule by mouth daily.       loratadine (CLARITIN) 10 mg tablet Take 10 mg by mouth daily.       mesalamine (DELZICOL) 400 mg delayed-release capsule Take 2 capsules (800 mg total) by mouth daily. 180 capsule 3     multivitamin capsule Take 1 capsule by mouth daily.       OMEGA-3/DHA/EPA/FISH OIL (FISH OIL-OMEGA-3 FATTY ACIDS) 300-1,000 mg capsule Take 2 g by mouth daily.       simvastatin (ZOCOR) 20 MG tablet TAKE 1 TABLET BY MOUTH EACH NIGHT AT BEDTIME 90 tablet 3     omeprazole (PRILOSEC) 20 MG capsule Take 1 capsule (20 mg total) by mouth daily. 30 capsule 0     zolpidem (AMBIEN) 10 mg tablet TAKE 1/2 TABLET (5 MG TOTAL) BY MOUTH AT BEDTIME AS NEEDED FOR SLEEP. 15 tablet 1     No current " facility-administered medications for this visit.      No Known Allergies  Social History   Substance Use Topics     Smoking status: Former Smoker     Quit date: 1/1/1990     Smokeless tobacco: Former User      Comment: occasional cigar smoker     Alcohol use 2.4 oz/week     1 Cans of beer, 3 Shots of liquor per week      Comment: weekly       Review and/or order of clinical lab tests:  Review and/or order of radiology tests:  Review and/or order of medicine tests:  Discussion of test results with performing physician:  Decision to obtain old records and/or obtain history from someone other than the patient:  Review and summarization of old records and/or obtaining history from someone other than the patient and.or discussion of case with another health care provider:  Independent visualization of image, tracing or specimen itself:    Time: not applicable     Hossein Rosario MD

## 2021-06-26 NOTE — PROGRESS NOTES
"Progress Notes by Aimee Henry CNP at 5/30/2018  7:50 AM     Author: Aimee Henry CNP Service: -- Author Type: Nurse Practitioner    Filed: 5/30/2018  9:20 AM Encounter Date: 5/30/2018 Status: Signed    : Aimee Henry CNP (Nurse Practitioner)           Click to link to [facility] HEART [facility] ELECTROPHYSIOLOGY NOTE      Assessment/Recommendations   Assessment/Plan:    Diagnoses and all orders for this visit:    Paroxysmal atrial fibrillation, Atypical atrial flutter and Typical atrial flutter all treated at the time of initial PVI.  He has had one reoccurrence in the first month after ablation and none since.  He has been off of antiarrhythmic since July 2017.  He has had no symptoms of reoccurrence of atrial fib since a year ago and directly symptomatic.  Thrilled with his results post ablation.  -     aspirin 81 MG EC tablet; Take 1 tablet (81 mg total) by mouth daily.      JESSIE on CPAP and using CPAP \"religiously \".  He had significant clinical improvement with CPAP use.    [id number] score of 0 and to decrease aspirin from 325 mg to 81 mg 1 tablet orally every day.  Follow up with Dr. Leonardo in 1 year.     History of Present Illness    Mr. Rik Zarate is a very pleasant 57 y.o. male who comes in today for EP follow-up regarding paroxysmal atrial fibrillation, atypical atrial flutter and typical atrial flutter and all these arrhythmias were treated at the time of his ablation in April 2017.  Rik Zarate has a known history of paroxysmal atrial fibrillation since February 2016 and very aggressive progression to persistent.   He was diagnosed with JESSIE and on CPAP.  He remains active working out in the gym.  He had initial an episode of persistent A. fib after ablation and had cardioversion in May 2017.   His sotalol was discontinued in July 2017.  He has had no symptoms of reoccurrence since.  He was directly symptomatic with A. fib and flutter before and  " initial episode after ablation was the same with regard to symptoms.  I believe his symptoms are still intact post ablation.  He has had seconds of feeling faster heart rate but then back in normal rhythm by the time he checks his pulse.  He is sporadically doing pulse checks and rhythm checks with [employer] darius.  He denies any cardiac symptoms on questioning including no change in energy level or tolerance of activity over the last year.        Cardiographics (personally reviewed):  Results for orders placed during the hospital encounter of 04/19/17   Echo HEIDY W Bubble [ECH12] 04/19/2017    Narrative   Left Ventricle: Normal size and systolic function.The calculated left   ventricular ejection fraction is 55%    Right Ventricle: Normal size and systolic function.    Left Atrium: Normal size. No left atrial or left atrial appendage   thrombus present. No spontaneous echo contrast in the left atrium or left   atrial appendage. Patent foramen ovale is not present. No shunts as   documented by negative color flow doppler and saline contrast injection.   Normal appendage velocities.    Mild mitral regurgitation.    No pericardial effusion.    No complications during transesophageal echocardiogram      December 2016 nuclear stress test normal.  February 2017 cardiac MR shows 4 pulmonary veins with normal configuration.  Left atrium is mildly enlarged.  Normal RV size and function.  Normal LV size and function; EF 60% and no myocardial scar.  No extracardiac findings.  July 2017 normal 2 week event monitor showing all sinus.    Results for orders placed or performed during the hospital encounter of 05/18/17   ECG 12 lead   Result Value Ref Range    SYSTOLIC BLOOD PRESSURE  mmHg    DIASTOLIC BLOOD PRESSURE  mmHg    VENTRICULAR RATE 52 BPM    ATRIAL RATE 52 BPM    P-R INTERVAL 194 ms    QRS DURATION 80 ms    Q-T INTERVAL 460 ms    QTC CALCULATION (BEZET) 427 ms    P Axis 69 degrees    R AXIS 75 degrees    T AXIS 38 degrees     MUSE DIAGNOSIS       Sinus bradycardia  Normal ECG  When compared with ECG of 17-MAY-2017 16:01,  Sinus rhythm has replaced Atrial fibrillation  Vent. rate has decreased BY  51 BPM  Confirmed by NILSON OVALLE MD LOC:JN ([id number]) on 5/18/2017 3:39:04 PM            Problem List:  Patient Active Problem List   Diagnosis   ? Hypercholesterolemia   ? Actinic Keratosis   ? Shoulder Tendonitis   ? Cough   ? Non-Hodgkin's Lymphoma   ? Abdominal Pain   ? Paroxysmal atrial fibrillation   ? JESSIE on CPAP   ? Marginal zone lymphoma   ? Status post catheter ablation of atrial fibrillation   ? Atypical atrial flutter   ? Typical atrial flutter   ? History of colitis       Physical Examination Review of Systems   Vitals:    05/30/18 0803   BP: 114/70   Pulse: 72   Resp: 16     Body mass index is 30.4 kg/(m^2).  Wt Readings from Last 3 Encounters:   05/30/18 218 lb (98.9 kg)   03/19/18 219 lb (99.3 kg)   10/18/17 212 lb (96.2 kg)     General Appearance:   Alert, well-appearing and in no acute distress.   HEENT: Atraumatic, normocephalic.  No scleral icterus, normal conjunctivae; mucous membranes pink and moist.     Chest: Chest symmetric, spine straight.   Lungs:   Respirations unlabored: Lungs are clear to auscultation.   Cardiovascular:   Normal first and second heart sounds with no murmurs, rubs, or gallops.  Regular, regular.  Radial and posterior tibial pulses are intact.  Normal JVD, no edema.       Extremities: No cyanosis or clubbing   Musculoskeletal: Moves all extremities   Skin: Warm, dry, intact.    Neurologic: Mood and affect are appropriate, alert and oriented to person, place, time, and situation    General: WNL  Eyes: WNL  Ears/Nose/Throat: WNL  Lungs: WNL  Heart: WNL  Stomach: WNL  Bladder: WNL  Muscle/Joints: WNL  Skin: WNL  Nervous System: WNL  Mental Health: WNL     Blood: WNL       Medical History  Surgical History Family History Social History   Past Medical History:   Diagnosis Date   ? Atrial fibrillation    ?  Cancer     marginal zone non hodgkins lymphoma   ? Colitis    ? High cholesterol    ? JESSIE on CPAP     Past Surgical History:   Procedure Laterality Date   ? CARDIOVERSION  05/18/2017   ? OTHER SURGICAL HISTORY  04/19/2017    atrial fib ablation   ? CO EPHYS EVAL W/ABLATION SUPRAVENT ARRHYTHMIA  4/19/2017    Procedure: EP Ablation Atrial Flutter;  Surgeon: Jones Leonardo MD;  Location: [facility] Cath [facility];  Service: Cardiology   ? CO EPHYS EVL TRNSPTL TX ATRIAL FIB ISOLAT PULM VEIN Left 4/19/2017    Procedure: EP Ablation PVI;  Surgeon: Jones Leonardo MD;  Location: [facility] Cath [facility];  Service: Cardiology    Family History   Problem Relation Age of Onset   ? Snoring Father    ? Leukemia Father    ? Lung cancer Mother    ? No Medical Problems Sister    ? Prostate cancer Brother    ? No Medical Problems Brother    ? Kidney disease Sister    ? No Medical Problems Sister     Social History     Social History   ? Marital status:      Spouse name: N/A   ? Number of children: N/A   ? 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 Smoking status: Former Smoker     Quit date: 1/1/1990   ? Smokeless tobacco: Former User      Comment: occasional cigar smoker   ? Alcohol use 2.4 oz/week     1 Cans of beer, 3 Shots of liquor per week      Comment: weekly   ? Drug use: No      Comment: Occasional use.   ? Sexual activity: Not on file     Other Topics Concern   ? Not on file     Social History Narrative          Medications  Allergies   Current Outpatient Prescriptions   Medication Sig Dispense Refill   ? aspirin 81 MG EC tablet Take 1 tablet (81 mg total) by mouth daily.     ? CALCIUM CITRATE/VITAMIN D3 (CITRACAL REGULAR ORAL) Take 1 tablet by mouth daily.     ? coQ10, ubiquinol, 100 mg cap Take 1 capsule by mouth daily.     ? loratadine (CLARITIN) 10 mg tablet Take 10 mg by mouth daily.     ? mesalamine (DELZICOL) 400 mg delayed-release capsule Take 2 capsules (800 mg  total) by mouth daily. 180 capsule 3   ? multivitamin capsule Take 1 capsule by mouth daily.     ? OMEGA-3/DHA/EPA/FISH OIL (FISH OIL-OMEGA-3 FATTY ACIDS) 300-1,000 mg capsule Take 2 g by mouth daily.     ? simvastatin (ZOCOR) 20 MG tablet TAKE 1 TABLET BY MOUTH EACH NIGHT AT BEDTIME 90 tablet 3   ? zolpidem (AMBIEN) 10 mg tablet TAKE 1/2 TABLET (5 MG TOTAL) BY MOUTH AT BEDTIME AS NEEDED FOR SLEEP. 15 tablet 1     No current facility-administered medications for this visit.       No Known Allergies   Medical, surgical, family, social history, and medications were all reviewed and updated as necessary.   Lab Results    Chemistry CBC/INR CHOLESTROL   Lab Results   Component Value Date    CREATININE 0.93 04/19/2017    BUN 18 04/19/2017     04/19/2017    K 4.2 04/19/2017     04/19/2017    CO2 24 04/19/2017     Creatinine (mg/dL)   Date Value   04/19/2017 0.93   04/12/2017 0.90   11/16/2016 0.98   01/11/2016 1.13     No results found for: BNP Lab Results   Component Value Date    WBC 15.4 (H) 04/19/2017    HGB 17.1 04/19/2017    HCT 51.1 04/19/2017    MCV 96 04/19/2017     04/19/2017     No results found for: INR   Lab Results   Component Value Date    CHOL 205 (H) 06/17/2014    HDL 37 (L) 11/16/2016    LDLCALC 126 06/17/2014    TRIG 153 (H) 06/17/2014          Greater than than 25 minutes were spent face to face in this visit discussing diagnoses as listed above, counseling, and coordination of care.    This note has been dictated using voice recognition software. Any grammatical, typographical, or context distortions are unintentional and inherent to the software.    Aimee Henry RN,  [facility]   Electrophysiology  [phone]

## 2021-06-27 NOTE — PROGRESS NOTES
Progress Notes by Ryna Hernandez MD at 12/10/2018  2:00 PM     Author: Ryan Hernandez MD Service: -- Author Type: Physician    Filed: 12/10/2018  4:33 PM Encounter Date: 12/10/2018 Status: Signed    : Ryan Hernandez MD (Physician)       MALE PREVENTATIVE EXAM    Assessment and Plan:       1. Healthcare maintenance  His main issue is his weight with sleep apnea and moderate cardiovascular risk factors with hypercholesterolemia and previous smoking, but normal blood pressure and previous negative cardiac CT scan.    Ophthalmology every other year due fall 2020    He declined immunizations today.    10-year colonoscopy due July 2019    2. JESSIE on CPAP  Compliant with CPAP, but still feels daytime sleepiness.  I did  him to reduce alcohol further.  Continue regular exercise, work on weight loss.    But refer back to sleep clinic again to reevaluate his CPAP machine for adequacy  - Ambulatory referral to Sleep Medicine    3. History of colitis  No recurrence since abdominal pain episode in August.  Continue mesalamine for diverticular associated bleeding/colitis.    4. Marginal zone lymphoma (H)  No evidence of progression.  Yearly follow-up with hematology in January  - HM1(CBC and Differential)  - HM1 (CBC with Diff)  - Manual Differential    5. Paroxysmal atrial fibrillation (H)  No recurrence since ablation April 2017.  Just low-dose aspirin.    6. Pure hypercholesterolemia  Simvastatin 20 mg, low HDL syndrome.  Moderate cardiovascular risk factors with smoking.  Goal LDL less than 130  - LDL Cholesterol, Direct  - HDL Cholesterol    7. Medication monitoring encounter    - Comprehensive Metabolic Panel  - HM1(CBC and Differential)  - HM1 (CBC with Diff)  - Manual Differential    8. Screening for prostate cancer    - PSA (Prostatic-Specific Antigen), Annual Screen      Next follow up:  No Follow-up on file.    Immunization Review    BMI: High BMI discussed      I discussed the following with the  patient:   Adult Healthy Living: Importance of regular exercise  Healthy nutrition    I have had an Advance Directives discussion with the patient.  Patient is full code    Subjective:   Chief Complaint: Rik Zarate is an 58 y.o. male here for a preventative health visit.     HPI: Here for his annual maintenance physical exam.  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  Moderately obese.  Does have sleep apnea but about 90% compliant with his CPAP now.  He did see the sleep clinic a year ago, but apparently what things were working okay then.  He has some moderate daytime sleepiness and he is interested in reevaluating his CPAP machine.    He is cut down on alcohol quite a bit but still drinking some.    He is no longer using Ambien at night.    He remains active at the gym with treadmill and stairmaster.  He is going downhill skiing soon.  Tolerates high levels of activity.  No increasing shortness of breath or chest pain or chest pressure.    2005 cardiac CT scan was a score of 0.    He does have hypercholesterolemia and is still on simvastatin.  He is a low HDL.  Simvastatin 20 mg a day.  No major diffuse myalgias.  He had some achiness of his legs earlier this summer, but that resolved and not current.    He quit smoking over 18 years ago.  No new cough or swallowing issues or mouth sores.  No hematuria.    No family history of coronary disease or diabetes.    Ultrasound in 2014 was negative for AAA.    Heartburn is well controlled on Prilosec.    He had some lower abdominal pain in August, he felt he was eating too much raw vegetables at the time, changed his diet and that resolved.  His bowels are now normal.    He has a past history of diverticular associated colitis with bleeding.  He takes mesalamine daily to prevent this which works well for him.  No blood in stool or abdominal pain now.    Colonoscopy July 2009 was negative.  10-year follow-up was due next year.  No family history of colon cancer.  That was  clarified from previous typo.    His brother did have prostate cancer.  Patient's urination is normal.  He has a in 2016 was 1.4.    No history of skin cancer.    He saw the ophthalmologist about a month ago, no problems, given a 2-year follow-up.    2015 his left eye retinal hemorrhage event.    He is taking a low-dose aspirin 81 mg a day.  I did discuss bleeding risk with patient.  He does wish to continue on that.  He has not had any bleeding issues.  No falls.    Paroxysmal atrial fibrillation with no recurrence since ablation in 2017.  No palpitations.  Cardiac MRI in 2017 with normal ejection fraction.  Off sotalol July of last year.  No longer on Eliquis since October of last year.  No TIA or stroke type symptoms.    Saw cardiology in May, 1 year follow-up plan.    His blood pressure has been normal.  No edema.    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  Bone marrow biopsy 2014.  Sees hematology yearly in January, has been stable.  No lymphadenopathy or night sweats.    He had some right shoulder tendinitis with raking earlier this fall, that resolved    Healthy Habits  Are you taking a daily aspirin? Yes  Do you typically exercising at least 40 min, 3-4 times per week?  Yes  Do you usually eat at least 4 servings of fruit and vegetables a day, include whole grains and fiber and avoid regularly eating high fat foods? Yes  Have you had an eye exam in the past two years? Yes  Do you see a dentist twice per year? Yes  Do you have any concerns regarding sleep? YES, Uses CPAP, trouble staying asleep    Safety Screen  If you own firearms, are they secured in a locked gun cabinet or with trigger locks? The patient declines to answer  Do you feel you are safe where you are living?: Yes (8/14/2018 12:00 PM)  Do you feel you are safe in your relationship(s)?: Yes (8/14/2018 12:00 PM)      Review of Systems:  Please see above.  The rest of the review of systems are negative for all  "systems.     Cancer Screening       Status Date      COLONOSCOPY Next Due 7/14/2019      Done 7/14/2009 ENDOSCOPY, COLON          Patient Care Team:  Ryan Hernandez MD as PCP - General        History     Not marked as reviewed during this visit.            Objective:   Vital Signs:   Visit Vitals  /78 (Patient Site: Right Arm, Patient Position: Sitting, Cuff Size: Adult Regular)   Pulse 69   Ht 5' 11\" (1.803 m)   Wt 216 lb 14.4 oz (98.4 kg)   SpO2 97%   BMI 30.25 kg/m           PHYSICAL EXAM  Alert and oriented x3 with normal mood and affect.  Pupils and irises equal and reactive.  Extractor muscles intact.  External ears and nose exam is normal with tympanic membranes normal.  Pharynx is moderately crowded.  No pharyngitis.  Teeth in good condition.  No leukoplakia or other oral lesions.  No cervical or supraclavicular or axillary or inguinal adenopathy.  No JVD and no carotid bruits.  No thyromegaly or nodularity.  Lungs are clear to auscultation with normal respiratory excursion.  Heart is regular with no murmur rub or gallop.  Abdomen is obese, moderately so.  Nontender no hepatosplenomegaly.  No pulsatile mass.  Testicles normal bilaterally.  No inguinal hernia.  Normal external genitalia.  +1 pedal pulses bilaterally and no ankle edema.  Gait normal.  Muscle skeletal exam otherwise normal.  Feet in good condition.  Rectal exam shows normal prostate smooth and symmetric.  Skin exam without suspicious lesions to inspection and palpation             Medication List           Accurate as of 12/10/18  4:29 PM. If you have any questions, ask your nurse or doctor.               CONTINUE taking these medications    aspirin 81 MG EC tablet  INSTRUCTIONS:  Take 1 tablet (81 mg total) by mouth daily.        CITRACAL REGULAR ORAL  INSTRUCTIONS:  Take 1 tablet by mouth daily.        CLARITIN 10 mg tablet  INSTRUCTIONS:  Take 10 mg by mouth daily.  Generic drug:  loratadine        coQ10 (ubiquinol) 100 mg " Cap  INSTRUCTIONS:  Take 1 capsule by mouth daily.        fish oil-omega-3 fatty acids 300-1,000 mg capsule  INSTRUCTIONS:  Take 2 g by mouth daily.        mesalamine 400 mg delayed-release capsule  Also known as:  DELZICOL  INSTRUCTIONS:  Take 2 capsules (800 mg total) by mouth daily.        multivitamin capsule  INSTRUCTIONS:  Take 1 capsule by mouth daily.        omeprazole 20 MG capsule  Also known as:  PriLOSEC  INSTRUCTIONS:  Take 1 capsule (20 mg total) by mouth daily.        simvastatin 20 MG tablet  Also known as:  ZOCOR  INSTRUCTIONS:  TAKE 1 TABLET BY MOUTH EACH NIGHT AT BEDTIME        zolpidem 10 mg tablet  Also known as:  AMBIEN  INSTRUCTIONS:  TAKE 1/2 TABLET (5 MG TOTAL) BY MOUTH AT BEDTIME AS NEEDED FOR SLEEP.  Doctor's comments:  Must be seen my physician this year.               Additional Screenings Completed Today:

## 2021-06-28 NOTE — PROGRESS NOTES
Progress Notes by Bernardo Oakley PA-C at 10/14/2019  8:00 AM     Author: Bernardo Oakley PA-C Service: -- Author Type: Physician Assistant    Filed: 10/14/2019 10:21 AM Encounter Date: 10/14/2019 Status: Signed    : Bernardo Oakley PA-C (Physician Assistant)        [facility] Study    Physical Examination  For abnormal findings, please evaluate if the finding is Clinically Significant (by 'CS') or Not Clinically Significant (by 'NCS')  General Appearance Normal  Head and Neck  Normal  Lungs    Normal  Cardiovascular  Normal  Abdomen   Normal  Musculoskeletal/Extremities Normal   Lymph Nodes  Normal  Skin    Normal  Neurological   Normal   Tremor absent     If present, evaluate severity on 1-10 scale    Bernardo Oakley PA-C

## 2021-06-28 NOTE — PROGRESS NOTES
Progress Notes by Deon Trejo at 10/14/2019  8:00 AM     Author: Deon Trejo Service: -- Author Type: Patient Access    Filed: 10/14/2019  1:38 PM Encounter Date: 10/14/2019 Status: Addendum    : Deon Trejo (Patient Access)    Related Notes: Original Note by Deon Trejo (Patient Access) filed at 10/14/2019 10:21 AM             Zestar Study Consent Visit    Study description: ECG and PPG Study: Zestar Study      Note time seated: 0808     Rik Zarate a 58 y.o. male , was seen in [facility] today to discuss participation in the Zestar study.   The consent discussion began on 10/10/19.  Please refer to phone call note from Rosalinda Scott for more details.  The consent form was reviewed with the patient.     The review of the study included:    Study purpose     Conflict of interest    Device description      Study visits    Risks of participation    Benefits (if any)    Alternatives    Voluntary participation    Confidentiality     Compensation/costs of participation    Study stipends    Injury and legal rights    The subject was provided time to review the consent form and consider participation. his questions were answered to his satisfaction.   The patient has voluntarily agreed to participate in the above noted study.     The consent form version 25 Sep 2019 and HIPAA form version 11 Jun 2019 was signed 10/14/19 at 0820    The subject was provided with a copy of the consent form and HIPPA. A copy of the signed forms was forwarded to medical records.    No study procedures were done prior to Rik Zarate providing informed consent.     Deon Trejo    Subject Restrictions During Study -Confirmed with Subject prior to any study procedures completed    Restrictions on jewelry, recreational drugs, caffeine, and exercise few days prior and during study.   1. Subjects should not consume excessive amount of caffeine (6 or more 8-oz cups of coffee, or more than 570 mg of caffeine from energy drinks, pills or similar  "substance) during their participation in the study.   2. Subjects should not consume excessive amount of alcohol for the duration of their participation in the study. A typical moderate amount is allowed during stage 3.   3. Subjects should not take any recreational drugs (including, but not limited to methamphetamines, cocaine, opioids, cannabis, LSD) for the duration of their participation in the study.   4. Subjects should not wear underwire bra or jewelry during the in-lab study (to not interfere with electrode placement and ECG data recordings).   5. Subject will not be permitted to have their cell phone or any electronic recording device on or with them during the in-lab test session(s).   6. Subjects under 22 years old will not be permitted to take ECG recordings through the ECG darius on the wrist-worn devices.     For study stage 3 only   1. Subjects should only do high intensity exercise (e.g. sprinting, heavy lifting, etc.) in the morning upon awakening or else not at all   2. Subjects should abstain from swimming during the time of the study   3. Subjects should only shower in the morning upon awakening (or else not at all)   4. Female subjects are strongly suggested to wear non-underwire bras throughout this stage of the study     Deon Trejo      Study Data collections   Vitals  (TPBP)     Vitals:    10/14/19 0824 10/14/19 0825 10/14/19 0826   BP: 158/90 155/87 144/87   Patient Site: Right Arm Right Arm Right Arm   Patient Position: Sitting Sitting Sitting   Cuff Size: Adult Regular Adult Regular Adult Regular   Pulse: 71 72 70   Resp:  17    Temp: 97.5  F (36.4  C)     Weight:   221 lb 1.6 oz (100.3 kg)   Height:   5' 9.69\" (1.77 m)      VS taken after 5 min rest     MAP 1    107  MAP 2      96   MAP 3             94          Body mass index is 32.01 kg/m .  male  1960  58 y.o.      Note time patient placed in supine position: 0830    Ethnicity   []   or    [x]  Not  or "   Race   []   or    []    []  Black or   []   or Other   [x]  White  Physical Activity Level  per subject report:   []  0- Extremely Inactive []  1- Sedentary [x]  2- Moderately Active  []  3- Vigorously Active []  4- Extremely Active  Trained Athlete   [] Yes  [x] No     Hearn's' Skin type   [] Type 1 [] Type 2 [x] Type 3    [] Type 4 [] Type 5 [] Type 6    Subject participated in previous ECG study at [facility]: [] Yes    [x] No    Past Medical History:   Diagnosis Date   ? Atrial fibrillation (H) 2016   ? Atypical atrial flutter (H) 2016   ? Cancer (H) 2009    marginal zone non hodgkins lymphoma   ? Colitis 2009   ? High cholesterol 1984   ? JESSIE on CPAP 2017       HISTORY OF HEART RHYTHM ABNORMALITIES   []  None   [x]  Atrial Flutter  [] Frequent PACs (>3 in 30 secs)  [] AF (permanent)  []  Tachycardia  [] Bigeminy, trigeminy, and/or quadgeminy  []  AF (persistent)  []  Heart Block   []  AF (paroxysmal)  [] PVCs    [] AF (other)    [] BBB    []  Other:   How many years? 3  Special interest allergies: active allergic skin reactions  No Known Allergies    Current Outpatient Medications:   ?  aspirin 81 MG EC tablet, Take 1 tablet (81 mg total) by mouth daily. (Patient taking differently: Take 162 mg by mouth daily.    ), Disp: , Rfl:   ?  CALCIUM CITRATE/VITAMIN D3 (CITRACAL REGULAR ORAL), Take 1 tablet by mouth daily., Disp: , Rfl:   ?  coQ10, ubiquinol, 100 mg cap, Take 1 capsule by mouth daily., Disp: , Rfl:   ?  loratadine (CLARITIN) 10 mg tablet, Take 10 mg by mouth daily., Disp: , Rfl:   ?  mesalamine (DELZICOL) 400 mg delayed-release capsule, TAKE 2 CAPSULES (800 MG TOTAL) BY MOUTH DAILY., Disp: 180 capsule, Rfl: 1  ?  multivitamin capsule, Take 1 capsule by mouth daily., Disp: , Rfl:   ?  NON FORMULARY, Take 1 capsule by mouth daily., Disp: , Rfl:   ?  OMEGA-3/DHA/EPA/FISH OIL (FISH OIL-OMEGA-3 FATTY ACIDS)  "300-1,000 mg capsule, Take 2 g by mouth daily., Disp: , Rfl:   ?  omeprazole (PRILOSEC) 20 MG capsule, Take 1 capsule (20 mg total) by mouth daily., Disp: 30 capsule, Rfl: 0  ?  simvastatin (ZOCOR) 20 MG tablet, TAKE 1 TABLET BY MOUTH EACH NIGHT AT BEDTIME, Disp: 90 tablet, Rfl: 1  ?  zolpidem (AMBIEN) 10 mg tablet, TAKE 0.5 TABLETS (5 MG TOTAL) BY MOUTH AT BEDTIME AS NEEDED FOR SLEEP., Disp: 15 tablet, Rfl: 4      10-sec 12-lead ECG & 30-sec 12-lead ECG rhythm strip done; reviewed by & PE done by Bernardo Oakley  Subject Questionnaire    OCCUPATION: Sales management    Predominately works outdoors  [] Yes    [x] No      Hours/week spent outdoors (total, not only for work): 20    Frequently participates in \"hand intensive\" activities [x] Yes [] No  Caffeine  [x]  Never  [] Occasionally        []  Daily (1 cup/day)     []  Daily (>1 cup/day)    Alcohol   []  Never  [] Light (drink or 2 occasionally) [x]  Moderate (a drink or 2 almost daily)   []  Occasional-heavy (more than a few drinks <2x / month)  [] Heavy (more than a few drinks >2x / month)    Tobacco/nicotine  [x]  Never  [] Rarely  []  Frequently/ Daily     Mattress Information  [] Subject did not participate in Stage 3  Mattress type:  []  Memory foam [] Gel  [x] Innerspring (coil)  [] Airbed  [] Waterbed [] Shikibuton  [] Hybrid  [] No mattress  [] Other (comment):    Mattress foundation   [] Mattress on floor/ground    [] Mattress on foundation/box spring on floor/ground  [x] Mattress on foundation/box spring on bed frame  [] Mattress on tatami on floor/ground  [] Other (comment):    Mattress topper   [] No mattress topper  [x] Pillow top  [] Foam top - flat style  [] Foam top - \"egg crate\" style  [] Other (comment):    Co-sleeper    [x]  Yes  []  No    CPAP use   [x] Yes  [] No      Dominant hand [x] left  [] right [] ambidextrous  Preferred Wrist to wear band on   []  left   [x]  right   Were screening day & study day: [x]  same [] different   Same: wrist " circumference:      180   mm   Device wearing wrist skin fold thickness:       5.4  mm  Wrist Band Size:     []  Flush Fit S/M  []  Non-Flush Fit S/M   [x]  Flush Fit M/L  []  Non-Flush Fit M/L  []  Flush Fit XL  []  Non-Flush Fit XL    Preferred/natural band notch: 4  Secure band notch: 4  Crown orientation:  [x]  left   []  right  Device wearing wrist hairiness:     []  Light []  Medium       [x]  Heavy  Spectophotometer    L  A  B   Reading #1  48.39  11.82  19.00   Reading #2  47.33  12.99  18.82   Reading #3  50.14  12.56  18.89     Pregnancy test  for WOCBP    [x] n/a male or female not child bearing potential  Room Temperature ( C): 23  CS Laptop ID: [id number]  CS Cam ID:[id number]  Device Set ID:[id number]  Wrist Device ID:[id number]  Subject has now completed their in-house participation in the Zestar study. Subject will complete Stage 3 at home for the next 3 days and return the equipment on 10/17/19.  Deon Trejo

## 2021-06-28 NOTE — PROGRESS NOTES
Progress Notes by Emerald Medrano at 10/17/2019  9:37 AM     Author: Emerald Medrano Service: -- Author Type: Patient Access    Filed: 10/17/2019  9:37 AM Encounter Date: 10/17/2019 Status: Signed    : Emerald Medrano (Patient Access)         Zestar Study Device Return    Rik Harveyuer returned all the devices for the Zestar study.  Rik Zarate denies medication changes or adverse events since last visit.    Rik Elliot has now completed their participation in the Zestar study.   Thank you for your gift of participation.    Emerald Medrano

## 2021-06-28 NOTE — PROGRESS NOTES
Progress Notes by Deon Trejo at 10/14/2019  8:00 AM     Author: Deon Trejo Service: -- Author Type: Patient Access    Filed: 10/26/2019  5:40 PM Encounter Date: 10/14/2019 Status: Signed    : Jones Leonardo MD (Physician)    Related Notes: Original Note by Deon Trejo (Patient Access) filed at 10/14/2019 10:21 AM           [facility] Study Inclusion / Exclusion Criteria  Protocol Version 2.0 ([id number])    Inclusion Criteria    Yes No Criteria # Subject must meet all inclusion criteria:      [x]    []  1 At least 18 years old for NSR and 22 years old for Non-NSR. Inclusive for both cohorts, at time of screening, with no upper limit on age.        [x]      []  2 To be enrolled as a non-NSR subject the volunteer must have one of the following conditions: Permanent/Persistent AF, Hx of paroxysmal AF, Hx of High-rate AF, AF + rate control medication, Hx Atrial Flutter, PVC burden >1%, Frequent PACs, Hx BBB, Hx 2nd degree block (any type), Hx Bigeminy/Trigeminy/Quadgeminy, Hx Tachycardia. For subjects with any of the following diagnoses, the condition must be present at the time of screening:   ? Permanent/persistent AF  ? PVC Buna  ? Frequent PACs   Note: If not present at screening, subjects may not be enrolled as a non-NSR subject or NSR subject.         3  [x] N/A HE site For Subjects to be enrolled as NSR they must be in NSR at the time of screening as determined by the investigator. For subjects with occasional ectopic beats (<1% burden during screening), they should still qualify as individuals in the NSR cohort as long as they don't have a medical history/diagnosis of significant ectopic burden.    [x]  []  4 Able to read and understand a written ICF    [x]  []  5 Willing and able to participate in the study procedures and comply with its restrictions    [x]  []  6 Able to communicate effectively with study staff as well as understand and follow directions    All must be Yes    Exclusion Criteria-all must  be no  Yes No Criteria # Subject must not meet any exclusion criteria:    []  [x]  1 Physical disability that prevents safe and adequate testing.   []  [x]  2 Pregnant women or women planning to become pregnant   []  [x]  3 Any acute illness or condition that may interfere with study procedures (e.g. cough, fever, sore throat, headache, sunburn, etc.)   []  [x]  4 Clinically significant hand tremors, as judged by the Investigator   []  [x]  5 Resting hypertension with systolic blood pressure ?161 mmHg or diastolic blood pressure ?101 mmHg (if at least 2 of 3 measurements meet this criteria)   []  [x]  6 Subjects with implanted cardiac devices such as a pacemaker or an automated implantable cardioverter- defibrillator (AICD)   []  [x]  7 Acute myocardial infarction (MI) within 90 days from the screening visit   []  [x]  8 Other cardiovascular disease that increases the risk to the subject or would render the data uninterpretable in the opinion of the Investigator (e.g., recent or ongoing unstable angina, significant valvular heart disease or chronic heart failure, myocarditis or pericarditis)    []  [x]  9 Acute pulmonary embolism, pulmonary infarction, or deep vein thrombosis within 90 days from the screening visit    []  [x]  10 Stroke or transient ischemic attack within 90 days from the screening visit    []  [x]  11 Known untreated medical conditions as determined by the Investigator, such as but not limited to significant anemia, important electrolyte imbalance and untreated or uncontrolled thyroid disease.    []  [x]  12 Any history of wrist surgery with scarring in the area of the sensor location on the wrist where the subject will be wearing the watch;    []  [x]  13 Open wound(s) on the wrist and forearm where the subject will be wearing the watch    []  [x]  14 Severe symptomatic (or active) overly dry/injured skin, skin disorders, or allergic skin reactions such as eczema, rosacea, impetigo,  dermatomyositis or allergic contact dermatitis on wrist and locations where the electrodes will be placed (e.g. chest, forearms, stomach), as determined by the investigator.    []  [x]  15 Tattoos, scars or moles in the area of the sensor location on the wrist where the subject will be wearing the watch    []  [x]  16 Device wearing Wrist circumference ? 129 mm or ? 246 mm    []  [x]  17 Known significant sensitivity to medical adhesives or isopropyl alcohol (for ECG electrode placement)    []  [x]  18 Known allergy or sensitivity to fluorocarbon-based synthetic rubber, such as contact dermatitis with fluoroelastomer bands primarily used in wrist worn fitness devices    []  [x]  19 Subjects with any Medical History, Physical exam, vital sign or any other study procedure finding/assessment that in the opinion of the investigator could compromise subject safety during study participation or interfere with the study integrity and/or the accurate assessment of the study objectives    []  [x]  20 Subject works for a company that develops or sells medical and/or fitness devices (e.g., ECG monitors, wearable fitness bands, sleep monitors, etc.) or are technology journalists (e.g., professional bloggers, TV, magazine, newspaper reporters, etc.)    []  [x]  21 Weight > 181 kg for subjects using the stationary bike and/or treadmill. Weight of ?138 kg for NSR subjects.    []  [x]  22 Subject is employed in shift work, or otherwise does not maintain a reasonably consistent day/night schedule (e.g. Subjects who go to bed after 4am).    []  [x]  23 Overnight travel planned during data collection nights    []  [x]  24 Non-NSR subjects should not have partaken in strenuous physical activity within 12 hours prior to screening    []  [x]  25 Non-NSR subjects with Atrial fibrillation categories: Subjects taking Class 1 or Class 3 antiarrhythmic agents such as the following may not take part in any stage of the study: amiodarone,  sotalol, dronedarone, ibutilide, dofetilide, propafenone, quinidine, procainamide, disopyramide, flecainide (Subjects taking class 2, 4 or 5 antiarrhythmic agents may take part the study).    []  [x]  26 Subjects who have both a history of paroxysmal AF and a Hearn skin type measurement of VI    []  [x]  27 Subjects who have missing index fingers on both hands    Patient fulfills inclusion criteria.  No exclusion criteria met.      Deon Trejo

## 2021-06-30 NOTE — PROGRESS NOTES
Progress Notes by Aimee Henry CNP at 2/18/2021  7:50 AM     Author: Aimee Henry CNP Service: -- Author Type: Nurse Practitioner    Filed: 2/18/2021  8:59 AM Encounter Date: 2/18/2021 Status: Signed    : Aimee Henry CNP (Nurse Practitioner)            Thank you, Dr. Hernandez, for asking the [facility] team to see Mr. Rik Zarate to evaluate atrial arrhythmias.    Assessment/Recommendations     Assessment/Plan:    Diagnoses and all orders for this visit:    Persistent atrial fibrillation (H) and off of sotalol since 2 months post ablation.  He is now almost 4 years out from ablation with no symptoms of recurrence of atrial fibrillation or EKG evidence outside the 3-month window.  Was directly symptomatic before and with initial episode post ablation.  On no antiarrhythmics.  Discussed brief flutters are likely PAT and not concerned about these.  To continue to occasionally check his rhythm on Modacruz darius.    Atypical atrial flutter (H)  And Typical atrial flutter (H) both treated at the time of ablation.    JESSIE on CPAP and using CPAP routinely.  Discussed importance of continuing this use as most frequent reason for late reoccurrence of atrial arrhythmias.  Dyslipidemia and on low-dose simvastatin.    [id number] score of 0 and on aspirin 81 mg orally every day and recommend for now that he stay on this.  Will defer to Dr. Hernandez in the future if he recommends he continue this.  Follow up in clinic with me or Dr. Leonardo as needed.  To call if any symptoms of recurrence of atrial fibrillation.     History of Present Illness/Subjective     Rik Zarate is a very pleasant 60 y.o. male who comes in today for EP follow-up for atrial arrhythmias.  Rik Zarate has a known history of persistent atrial fibrillation, atypical atrial flutter and typical atrial flutter and all these arrhythmias were treated at the time of his ablation in April 2017.  Rik Zarate has a known  history of paroxysmal atrial fibrillation since February 2016 and very aggressive progression to persistent.   He was diagnosed with JESSIE and on CPAP. He had initial an episode of persistent A. fib after ablation and had cardioversion in May 2017.   His sotalol was discontinued in July 2017.  He has had no symptoms of reoccurrence since.  He was directly symptomatic with A. fib and flutter before and  initial episode after ablation was the same with regard to symptoms.    Joey tells me that he has made some choices to improve his health.  He has decreased his work to 50% most of the time.  He has lost 25 pounds in the last several months.  He is exercising on a routine basis at a gym.  He has also  done some traveling and much more active with travels.  Joey reports occasional flutters which last only seconds.  By the time he opens his FTL SOLAR darius he is back in normal rhythm.  He checks his rhythm occasionally on his darius and also showing normal rhythm.  He has had no symptoms of recurrence of atrial fib since he saw Dr. Leonardo about 14 months ago.  He is on low-dose simvastatin and will be seeing his primary for yearly physical.  He has no history of hypertension.  He is on low-dose aspirin without any problems and has been on aspirin for many years per patient.      Cardiographics (reviewed):  Results for orders placed during the hospital encounter of 04/19/17   Echo HEIDY W Bubble [ECH12] 04/19/2017    Narrative   Left Ventricle: Normal size and systolic function.The calculated left   ventricular ejection fraction is 55%    Right Ventricle: Normal size and systolic function.    Left Atrium: Normal size. No left atrial or left atrial appendage   thrombus present. No spontaneous echo contrast in the left atrium or left   atrial appendage. Patent foramen ovale is not present. No shunts as   documented by negative color flow doppler and saline contrast injection.   Normal appendage velocities.    Mild mitral  regurgitation.    No pericardial effusion.    No complications during transesophageal echocardiogram            December 2016 nuclear stress test normal.  February 2017 cardiac MR shows 4 pulmonary veins with normal configuration.  Left atrium is mildly enlarged.  Normal RV size and function.  Normal LV size and function; EF 60% and no myocardial scar.  No extracardiac findings.  July 2017 normal 2 week event monitor showing all sinus.    Cardiac testing personally reviewed:      Results for orders placed or performed in visit on 10/14/19   ECG Clinic - Today   Result Value Ref Range    SYSTOLIC BLOOD PRESSURE      DIASTOLIC BLOOD PRESSURE      VENTRICULAR RATE 72 BPM    ATRIAL RATE 72 BPM    P-R INTERVAL 190 ms    QRS DURATION 84 ms    Q-T INTERVAL 390 ms    QTC CALCULATION (BEZET) 427 ms    P Axis 67 degrees    R AXIS 90 degrees    T AXIS 57 degrees    MUSE DIAGNOSIS       Normal sinus rhythm  Rightward axis  Borderline ECG  When compared with ECG of 18-MAY-2017 10:10,  No significant change was found  Confirmed by RUDOLPH MCKEE, LAWANDA LOC:JN ([id number]) on 10/14/2019 3:55:40 PM            Problem List:  Patient Active Problem List   Diagnosis   ? Hypercholesterolemia   ? Actinic Keratosis   ? Shoulder Tendonitis   ? Cough   ? Non-Hodgkin's Lymphoma   ? Abdominal Pain   ? Paroxysmal atrial fibrillation (H)   ? JESSIE on CPAP   ? Marginal zone lymphoma (H)   ? Status post catheter ablation of atrial fibrillation   ? Atypical atrial flutter (H)   ? Typical atrial flutter (H)   ? History of colitis   ? Cervical radiculopathy     Revi  e  Physical Examination Review of Systems   w [facility]  Vitals:    02/18/21 0755   BP: 120/64   Pulse: 62   Resp: 16   SpO2: 97%     Body mass index is 29.83 kg/m .  Wt Readings from Last 3 Encounters:   02/18/21 202 lb (91.6 kg)   12/04/19 220 lb (99.8 kg)   10/14/19 221 lb 1.6 oz (100.3 kg)     General Appearance:   Alert, well-appearing and in no acute distress.   HEENT: Atraumatic,  normocephalic.  No scleral icterus, normal conjunctivae; mucous membranes pink and moist.     Chest: Chest symmetric, spine straight.   Lungs:   Respirations unlabored: Lungs are clear to auscultation.   Cardiovascular:   Normal first and second heart sounds with no murmurs, rubs, or gallops.  Regular, regular.  Radial and posterior tibial pulses are intact.  Normal JVD, no edema.       Extremities: No cyanosis or clubbing   Musculoskeletal: Moves all extremities   Skin: Warm, dry, intact.    Neurologic: Mood and affect are appropriate, alert and oriented to person, place, time, and situation    General: WNL  Eyes: WNL  Ears/Nose/Throat: WNL  Lungs: WNL  Heart: WNL  Stomach: WNL  Bladder: WNL  Muscle/Joints: WNL  Skin: WNL  Nervous System: WNL  Mental Health: WNL     Blood: WNL       Medical History  Surgical History Family History Social History     Past Medical History:   Diagnosis Date   ? Atrial fibrillation (H) 2016   ? Atypical atrial flutter (H) 2016   ? Cancer (H) 2009    marginal zone non hodgkins lymphoma   ? Colitis 2009   ? High cholesterol 1984   ? JESSIE on CPAP 2017    Past Surgical History:   Procedure Laterality Date   ? CARDIOVERSION  05/18/2017   ? OTHER SURGICAL HISTORY  04/19/2017    atrial fib ablation   ? [facility] EPHYS EVAL W/ABLATION SUPRAVENT ARRHYTHMIA  4/19/2017    Procedure: EP Ablation Atrial Flutter;  Surgeon: Jones Leonardo MD;  Location: [facility] Cath Lab;  Service: Cardiology   ? [facility] EPHYS EVL TRNSPTL [place] ATRIAL FIB ISOLAT PULM VEIN Left 4/19/2017    Procedure: EP Ablation PVI;  Surgeon: Jones Leonardo MD;  Location: [facility] Cath Lab;  Service: Cardiology    Family History   Problem Relation Age of Onset   ? Snoring Father    ? Leukemia Father    ? Lung cancer Mother    ? No Medical Problems Sister    ? Prostate cancer Brother    ? No Medical Problems Brother    ? Kidney disease Sister    ? No Medical Problems Sister     Social History     Tobacco Use   Smoking Status Former Smoker    ? Quit date: 1990   ? Years since quittin.1   Smokeless Tobacco Former User   Tobacco Comment    occasional cigar smoker     Social History     Substance and Sexual Activity   Alcohol Use Yes   ? Alcohol/week: 4.0 standard drinks   ? Types: 1 Cans of beer, 3 Shots of liquor per week    Comment: weekly        Medications  Allergies     Current Outpatient Medications   Medication Sig Dispense Refill   ? aspirin 81 MG EC tablet Take 1 tablet (81 mg total) by mouth daily. (Patient taking differently: Take 162 mg by mouth daily.       )     ? coQ10, ubiquinol, 100 mg cap Take 1 capsule by mouth daily.     ? loratadine (CLARITIN) 10 mg tablet Take 10 mg by mouth daily.     ? mesalamine (DELZICOL) 400 mg delayed-release capsule TAKE 2 CAPSULES (800 MG TOTAL) BY MOUTH DAILY. 180 capsule 3   ? multivitamin capsule Take 1 capsule by mouth daily.     ? NON FORMULARY Take 1 capsule by mouth daily.     ? OMEGA-3/DHA/EPA/FISH OIL (FISH OIL-OMEGA-3 FATTY ACIDS) 300-1,000 mg capsule Take 2 g by mouth daily.     ? simvastatin (ZOCOR) 20 MG tablet TAKE 1 TABLET BY MOUTH EACH NIGHT AT BEDTIME 90 tablet 3   ? zolpidem (AMBIEN) 10 mg tablet TAKE A HALF TABLETS (5 MG) BY MOUTH AT BEDTIME AS NEEDED FOR SLEEP. 15 tablet 4     No current facility-administered medications for this visit.       No Known Allergies   Medical, surgical, family, social history, and medications were all reviewed and updated as necessary.   Lab Results    Chemistry/lipid CBC Cardiac Enzymes/BNP/TSH/INR     Lab Results   Component Value Date    CREATININE 0.87 12/10/2018    BUN 22 12/10/2018     12/10/2018    K 4.3 12/10/2018     12/10/2018    CO2 29 12/10/2018     Creatinine (mg/dL)   Date Value   12/10/2018 0.87   2018 0.85   2017 0.93   2017 0.90     No results found for: BNP Lab Results   Component Value Date    WBC 14.2 (H) 2018    HGB 15.8 2018    HCT 47.2 2018    MCV 96 2018      08/14/2018     No results found for: INR   Lab Results   Component Value Date    CHOL 205 (H) 06/17/2014    HDL 41 12/10/2018    LDLCALC 126 06/17/2014    TRIG 153 (H) 06/17/2014          Total Time- 35 minutes spent on date of encounter doing chart review, history and exam, documentation and further activities as noted above.  This note has been dictated using voice recognition software. Any grammatical, typographical, or context distortions are unintentional and inherent to the software.

## 2021-07-01 NOTE — H&P
H&P by Jones Leonardo MD at 5/18/2017  9:05 AM     Author: Jones Leonardo MD Service: Cardiology Author Type: Physician    Filed: 5/18/2017  9:10 AM Date of Service: 5/18/2017  9:05 AM Status: Signed    : Jones Leonardo MD (Physician)           Click to link to [facility] Heart [facility] HEART CARE NOTE    Thank you, , for asking the [facility] Heart Care team to see Mr. Rik Zarate for outpatient cardioversion.      Assessment/Recommendations   Assessment:      Recurrent symptomatic paroxysmal atrial fibrillation: The patient developed recurrent symptoms of atrial fibrillation and presents today for cardioversion.  Beyond the recognition of palpitations and some mild fatigue the patient notes no new cardiovascular symptoms.  The patient missed a single dose of his oral anticoagulant approximately 1 week ago otherwise has been completely compliant.  The patient recently started sotalol in anticipation of his cardioversion today.  The risks benefits and expected outcomes of the procedure been explained to the patient in detail and the patient agrees to proceed.    Obstructive sleep apnea: The patient is compliant with therapy    Plan:    Okay to move forward with scheduled cardioversion under brief general anesthesia today.                  History of Present Illness/Subjective    Mr. Rik Zarate is a 56 y.o. male with history of paroxysmal atrial fibrillation status post ablation on April 19, 2017.  The patient developed recurrent symptoms of palpitations and knew immediately that he was back in atrial fibrillation.  The on the palpitations the patient's is noticed a mild decrease in his exercise tolerance but notes no chest pain lightheadedness presyncope or syncope.  He has been compliant with his medical therapy and states that he probably missed a single dose of his Eliquis about 1 week ago.  He has had no neurologic symptoms.  The patient has been educated regarding the scheduled  procedure and he is in agreement with proceeding.    ECG: Telemetry, personally reviewed demonstrates coarse atrial fibrillation with a controlled ventricular response.  QRS duration is normal.  Personally reviewed.     ECHO:     Other Studies:        Physical Examination Review of Systems   Vitals:    05/18/17 0816   Pulse: 97   Resp: 16   Temp: 97.9  F (36.6  C)   SpO2: 96%     Body mass index is 28.9 kg/(m^2).  Wt Readings from Last 3 Encounters:   05/18/17 207 lb 3.7 oz (94 kg)   04/24/17 217 lb 11.2 oz (98.7 kg)   04/21/17 (!) 223 lb 9.6 oz (101.4 kg)     No intake or output data in the 24 hours ending 05/18/17 0906    General     Appearance:   The patient is alert oriented to person place and situation.    The patient is in no acute distress at the time of my evaluation.   HEENT:  Pupils are equal, round, and reactive to light.  Conjunctiva and sclera are clear.  ENT: Oral mucosa is moist and without redness. No evident nasal discharge.   Neck: Without palpable thyroid or appreciable lymph nodes.   Chest: Symmetric, without deformity.   Lungs:   Clear bilaterally with no rales, rhonchi, or wheezes.     Cardiovascular:   Rhythm is irregular.  S1 and S2 are normal. No significant murmur is present. JVP is normal. Lower extremities demonstrate no significant edema. Distal pulses are intact bilaterally.   Abdomen:  Abdomen is flat, soft, and nontender.   Extremities: Without deformity.   Skin: Skin is warm, dry, and otherwise intact.   Neurologic: Gait is normal.           A 12 point comprehensive review of systems was  negative except as noted.      Medical History  Surgical History Family History Social History   Past Medical History:   Diagnosis Date   ? Atrial fibrillation    ? Cancer     marginal zone non hodgkins lymphoma   ? Colitis    ? High cholesterol    ? JESSIE on CPAP     Past Surgical History:   Procedure Laterality Date   ? CARDIOVERSION  05/18/2017   ? OTHER SURGICAL HISTORY  04/19/2017    atrial fib  ablation   ? [place] EPHYS EVAL W/ABLATION SUPRAVENT ARRHYTHMIA  4/19/2017    Procedure: EP Ablation Atrial Flutter;  Surgeon: Jones Leonardo MD;  Location: [facility] Cath Lab;  Service: Cardiology   ? [place] EPHYS EVL TRNSPTL [place] ATRIAL FIB ISOLAT PULM VEIN Left 4/19/2017    Procedure: EP Ablation PVI;  Surgeon: Jones Leonardo MD;  Location: [facility] Cath Lab;  Service: Cardiology    Family History   Problem Relation Age of Onset   ? Snoring Father    ? Leukemia Father    ? Lung cancer Mother    ? No Medical Problems Sister    ? Prostate cancer Brother    ? No Medical Problems Brother    ? Kidney disease Sister    ? No Medical Problems Sister     Social History     Social History   ? Marital status:      Spouse name: N/A   ? Number of children: N/A   ? 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 Smoking status: Former Smoker     Quit date: 1/1/1990   ? Smokeless tobacco: Not on file      Comment: occasional cigar smoker   ? Alcohol use 2.4 oz/week     1 Cans of beer, 3 Shots of liquor per week      Comment: weekly   ? Drug use: No      Comment: Occasional use.   ? Sexual activity: Not on file     Other Topics Concern   ? Not on file     Social History Narrative          Medications  Allergies   Scheduled Meds:  ? sodium chloride  3 mL Intravenous Line Care     Continuous Infusions:  ? nacl 0.9% 25 mL/hr (05/18/17 0903)     PRN Meds:.lidocaine (XYLOCAINE) 1 % local injection No Known Allergies      Lab Results    Chemistry/lipid CBC Cardiac Enzymes/BNP/TSH/INR   Lab Results   Component Value Date    CHOL 205 (H) 06/17/2014    HDL 37 (L) 11/16/2016    LDLCALC 126 06/17/2014    TRIG 153 (H) 06/17/2014    CREATININE 0.93 04/19/2017    BUN 18 04/19/2017    K 4.2 04/19/2017     04/19/2017     04/19/2017    CO2 24 04/19/2017    Lab Results   Component Value Date    WBC 15.4 (H) 04/19/2017    HGB 17.1 04/19/2017    HCT 51.1 04/19/2017    MCV 96 04/19/2017      04/19/2017    Lab Results   Component Value Date    TSH 2.60 01/11/2016

## 2021-07-03 NOTE — ADDENDUM NOTE
Addendum Note by Mahesh Rea DO at 7/1/2019  2:43 PM     Author: Mahesh Rea DO Service: -- Author Type: Physician    Filed: 7/1/2019  2:43 PM Encounter Date: 6/25/2019 Status: Signed    : Mahesh Rea DO (Physician)    Addended by: MAHESH REA on: 7/1/2019 02:43 PM        Modules accepted: Orders

## 2021-09-19 ENCOUNTER — HEALTH MAINTENANCE LETTER (OUTPATIENT)
Age: 61
End: 2021-09-19

## 2021-10-05 ENCOUNTER — TELEPHONE (OUTPATIENT)
Dept: INTERNAL MEDICINE | Facility: CLINIC | Age: 61
End: 2021-10-05

## 2021-10-05 ENCOUNTER — TELEPHONE (OUTPATIENT)
Dept: FAMILY MEDICINE | Facility: CLINIC | Age: 61
End: 2021-10-05

## 2021-10-05 NOTE — TELEPHONE ENCOUNTER
The patient was informed of the clinician message and verbalized understanding. He stated that he would like to be evaluated in clinic at next soonest opening with an available clinician. Patient scheduled with Thomas Anderson CNP for this afternoon for evaluation of need for antibiotics prior to dental work.

## 2021-10-05 NOTE — TELEPHONE ENCOUNTER
Dental infection should be treated by a dentist.  A dentist can prescribe an antibiotic for dental infection.  Patient has not been seen by me since 2018.  Patient should treat dental infection under the care of his dentist.  If patient needs medical treatment for a dental infection, patient will need to be seen in clinic, would recommend urgent care or next available provider to evaluate patient for antibiotics patient

## 2021-10-05 NOTE — TELEPHONE ENCOUNTER
Reason for Call:  Other prescription    Detailed comments: Patient called and stated he went to the dentist yesterday and was told he had a bad tooth so he couldn't have his root canal. Patient was told by his dentist to call his PCP and request a antibiotic.  If PCP agrees to prescribe an antibiotic, please send it to [facility] [address] in [place], [phone].      Phone Number Patient can be reached at: Cell number on file:    Telephone Information:   Mobile [phone]     Best Time: any    Can we leave a detailed message on this number? YES    Call taken on 10/5/2021 at 8:48 AM by Elana Walsh

## 2021-10-05 NOTE — TELEPHONE ENCOUNTER
I called the patient to inquire about the current situation he was in.  Turns out he was in the parking lot and so I met him in a patient room to discuss his case.    He has some mild discomfort in his right upper jawline, proximal to the sinus but seems to be radiating to the ear.  He called his dentist office late last week but was not able to get in contact with anyone.  His pain dissipated over the weekend but then became an acute problem early this morning.    He has not seen his dentist or endodontist specifically about his discomfort since this past spring.    Denies any fevers.  No swallowing difficulty.  No mucopurulent drainage from his nasal airways.  No sore throat.  No cough.    He says his discomfort is managed by ibuprofen.    He does have an upcoming appointment with his endodontist next week.    I told him that I would not feel comfortable managing his tooth related issue at this point and that I feel confident that he should make it through to his appointment next week without any issues.  In the meantime, he can use Tylenol or ibuprofen as needed for discomfort.    He verbalized understanding of the plan.  This will be a no charge visit

## 2021-10-15 DIAGNOSIS — E78.00 HYPERCHOLESTEROLEMIA: Primary | ICD-10-CM

## 2021-10-15 RX ORDER — SIMVASTATIN 20 MG
TABLET ORAL
COMMUNITY
Start: 2021-03-11 | End: 2021-10-15

## 2021-10-15 RX ORDER — SIMVASTATIN 20 MG
TABLET ORAL
Qty: 90 TABLET | Refills: 0 | Status: SHIPPED | OUTPATIENT
Start: 2021-10-15 | End: 2022-01-06

## 2021-10-15 NOTE — TELEPHONE ENCOUNTER
Refill request received from [facility] via fax for a refill of Simvastatin 20 mg. Order pended.    Last OV/VV on 12/11/2018  Next scheduled appointment with PCP Apple Wynne CMA ............... 10:05 AM, 10/15/21

## 2022-01-03 PROBLEM — M54.12 CERVICAL RADICULOPATHY: Status: RESOLVED | Noted: 2020-07-02 | Resolved: 2022-01-03

## 2022-01-03 PROBLEM — Z87.19 HISTORY OF COLITIS: Status: RESOLVED | Noted: 2018-03-19 | Resolved: 2022-01-03

## 2022-01-06 ENCOUNTER — OFFICE VISIT (OUTPATIENT)
Dept: INTERNAL MEDICINE | Facility: CLINIC | Age: 62
End: 2022-01-06
Payer: COMMERCIAL

## 2022-01-06 VITALS
BODY MASS INDEX: 28.67 KG/M2 | HEART RATE: 61 BPM | WEIGHT: 204.8 LBS | OXYGEN SATURATION: 96 % | DIASTOLIC BLOOD PRESSURE: 81 MMHG | SYSTOLIC BLOOD PRESSURE: 135 MMHG | HEIGHT: 71 IN

## 2022-01-06 DIAGNOSIS — I48.19 PERSISTENT ATRIAL FIBRILLATION (H): ICD-10-CM

## 2022-01-06 DIAGNOSIS — Z12.5 SCREENING FOR PROSTATE CANCER: ICD-10-CM

## 2022-01-06 DIAGNOSIS — E78.00 HYPERCHOLESTEROLEMIA: ICD-10-CM

## 2022-01-06 DIAGNOSIS — G47.33 OSA ON CPAP: ICD-10-CM

## 2022-01-06 DIAGNOSIS — C85.80 MARGINAL ZONE LYMPHOMA (H): Primary | ICD-10-CM

## 2022-01-06 DIAGNOSIS — E78.00 PURE HYPERCHOLESTEROLEMIA: ICD-10-CM

## 2022-01-06 DIAGNOSIS — K52.9 COLITIS: ICD-10-CM

## 2022-01-06 LAB
ALBUMIN SERPL-MCNC: 4.5 G/DL (ref 3.5–5)
ALP SERPL-CCNC: 66 U/L (ref 45–120)
ALT SERPL W P-5'-P-CCNC: 40 U/L (ref 0–45)
ANION GAP SERPL CALCULATED.3IONS-SCNC: 9 MMOL/L (ref 5–18)
AST SERPL W P-5'-P-CCNC: 27 U/L (ref 0–40)
BILIRUB SERPL-MCNC: 0.7 MG/DL (ref 0–1)
BUN SERPL-MCNC: 17 MG/DL (ref 8–22)
CALCIUM SERPL-MCNC: 9.8 MG/DL (ref 8.5–10.5)
CHLORIDE BLD-SCNC: 102 MMOL/L (ref 98–107)
CHOLEST SERPL-MCNC: 185 MG/DL
CO2 SERPL-SCNC: 29 MMOL/L (ref 22–31)
CREAT SERPL-MCNC: 0.74 MG/DL (ref 0.7–1.3)
FASTING STATUS PATIENT QL REPORTED: YES
GFR SERPL CREATININE-BSD FRML MDRD: >90 ML/MIN/1.73M2
GLUCOSE BLD-MCNC: 96 MG/DL (ref 70–125)
HDLC SERPL-MCNC: 48 MG/DL
LDLC SERPL CALC-MCNC: 115 MG/DL
POTASSIUM BLD-SCNC: 4.4 MMOL/L (ref 3.5–5)
PROT SERPL-MCNC: 7.4 G/DL (ref 6–8)
PSA SERPL-MCNC: 1.32 UG/L (ref 0–4.5)
SODIUM SERPL-SCNC: 140 MMOL/L (ref 136–145)
TRIGL SERPL-MCNC: 112 MG/DL

## 2022-01-06 PROCEDURE — 36415 COLL VENOUS BLD VENIPUNCTURE: CPT | Performed by: INTERNAL MEDICINE

## 2022-01-06 PROCEDURE — 80061 LIPID PANEL: CPT | Performed by: INTERNAL MEDICINE

## 2022-01-06 PROCEDURE — G0103 PSA SCREENING: HCPCS | Performed by: INTERNAL MEDICINE

## 2022-01-06 PROCEDURE — 80053 COMPREHEN METABOLIC PANEL: CPT | Performed by: INTERNAL MEDICINE

## 2022-01-06 PROCEDURE — 99214 OFFICE O/P EST MOD 30 MIN: CPT | Mod: 25 | Performed by: INTERNAL MEDICINE

## 2022-01-06 PROCEDURE — 99396 PREV VISIT EST AGE 40-64: CPT | Performed by: INTERNAL MEDICINE

## 2022-01-06 RX ORDER — MESALAMINE 400 MG/1
CAPSULE, DELAYED RELEASE ORAL
COMMUNITY
Start: 2012-01-01 | End: 2022-01-06

## 2022-01-06 RX ORDER — MULTIVITAMIN
1 CAPSULE ORAL DAILY
COMMUNITY

## 2022-01-06 RX ORDER — CHLORAL HYDRATE 500 MG
2 CAPSULE ORAL
COMMUNITY

## 2022-01-06 RX ORDER — MESALAMINE 400 MG/1
800 CAPSULE, DELAYED RELEASE ORAL DAILY
Qty: 180 CAPSULE | Refills: 3 | Status: SHIPPED | OUTPATIENT
Start: 2022-01-06 | End: 2022-12-08

## 2022-01-06 RX ORDER — SIMVASTATIN 20 MG
TABLET ORAL
Qty: 90 TABLET | Refills: 3 | Status: SHIPPED | OUTPATIENT
Start: 2022-01-06 | End: 2022-11-01

## 2022-01-06 RX ORDER — LORATADINE 10 MG/1
10 TABLET ORAL DAILY
COMMUNITY
End: 2024-09-17

## 2022-01-06 ASSESSMENT — MIFFLIN-ST. JEOR: SCORE: 1751.1

## 2022-01-06 NOTE — PROGRESS NOTES
"SUBJECTIVE:   CC: Rik Zarate is an 61 year old male who presents for preventative health visit.     Joey comes in today for his yearly physical.  He is a 61-year-old patient of  who would need to get a physical in as apparently his medications would not be refilled without one.  He is feeling well today and has no acute complaints.  Past medical history above and beyond his physical exam was reviewed and is outlined as below:.    1.  History of atrial fibrillation status post ablation in 2017.  No recurrent symptoms.    2.  History of \"colitis\", on mesalamine at 800 mg/day.  I could not find a recent colonoscopy nor any mention of a specific colitis in our medical record.  He states that he used to see a gastroenterologist but patient tells me that Dr. Hernandez told him that he would be willing to take over prescribing this medication.  Due for refill today.    3.  Hyperlipidemia, on 20 mg of simvastatin.    4.  History of marginal zone lymphoma.  He sees oncology on a yearly basis.    #5.  Obstructive sleep apnea, on CPAP.  Stable.      Patient has been advised of split billing requirements and indicates understanding: Yes  Healthy Habits:     Getting at least 3 servings of Calcium per day:  NO    Bi-annual eye exam:  Yes    Dental care twice a year:  Yes    Sleep apnea or symptoms of sleep apnea:  Sleep apnea    Diet:  Regular (no restrictions)    Frequency of exercise:  4-5 days/week    Duration of exercise:  30-45 minutes    Taking medications regularly:  Yes    Medication side effects:  Not applicable    PHQ-2 Total Score: 0    Additional concerns today:  Yes    Today's PHQ-2 Score:   PHQ-2 ( 1999 [employer]) 12/30/2021   Q1: Little interest or pleasure in doing things 0   Q2: Feeling down, depressed or hopeless 0   PHQ-2 Score 0   Q1: Little interest or pleasure in doing things Not at all   Q2: Feeling down, depressed or hopeless Not at all   PHQ-2 Score 0       Abuse: Current or Past(Physical, Sexual or " "Emotional)- No  Do you feel safe in your environment? Yes        Social History     Tobacco Use     Smoking status: Former Smoker     Quit date: 1990     Years since quittin.0     Smokeless tobacco: Former User     Tobacco comment: occasional cigar smoker   Substance Use Topics     Alcohol use: Yes     Alcohol/week: 4.0 standard drinks     Comment: Alcoholic Drinks/day: weekly     If you drink alcohol do you typically have >3 drinks per day or >7 drinks per week? Yes      Alcohol Use 2021   Prescreen: >3 drinks/day or >7 drinks/week? Yes   AUDIT SCORE  9       Last PSA:   Prostate Specific Antigen Screen   Date Value Ref Range Status   12/10/2018 1.4 0.0 - 3.5 ng/mL Final         OBJECTIVE:   /81   Pulse 61   Ht 1.803 m (5' 11\")   Wt 92.9 kg (204 lb 12.8 oz)   SpO2 96%   BMI 28.56 kg/m      Physical Exam  GENERAL: healthy, alert and no distress  EYES: Eyes grossly normal to inspection, PERRL and conjunctivae and sclerae normal  HENT: ear canals and TM's normal, nose and mouth without ulcers or lesions  NECK: no adenopathy, no asymmetry, masses, or scars and thyroid normal to palpation  RESP: lungs clear to auscultation - no rales, rhonchi or wheezes  CV: regular rate and rhythm, normal S1 S2, no S3 or S4, no murmur, click or rub, no peripheral edema and peripheral pulses strong  ABDOMEN: soft, nontender, no hepatosplenomegaly, no masses and bowel sounds normal  MS: no gross musculoskeletal defects noted, no edema  SKIN: no suspicious lesions or rashes  NEURO: Normal strength and tone, mentation intact and speech normal  PSYCH: mentation appears normal, affect normal/bright    Diagnostic Test Results:  Labs reviewed in Epic    ASSESSMENT/PLAN:   (C85.80) Marginal zone lymphoma (H)  (primary encounter diagnosis)  Comment: Continue follow-up with oncology as scheduled.  Plan:     (G47.33,  Z99.89) JESSIE on CPAP  Comment: Stable.  Continue CPAP.  Plan:     (I48.19) Persistent atrial fibrillation " "(H)  Comment: Stable.  Well rate controlled  Plan:     (E78.00) Hypercholesterolemia  Comment:   Plan: Comprehensive metabolic panel (BMP + Alb, Alk         Phos, ALT, AST, Total. Bili, TP), Lipid Profile        (Chol, Trig, HDL, LDL calc)            (Z12.5) Screening for prostate cancer  Comment:   Plan: PSA, screen            (K52.9) Colitis  Comment:   Plan: mesalamine (DELZICOL) 400 MG DR capsule            (E78.00) Hypercholesterolemia  Comment:   Plan: simvastatin (ZOCOR) 20 MG tablet            Estimated body mass index is 28.56 kg/m  as calculated from the following:    Height as of this encounter: 1.803 m (5' 11\").    Weight as of this encounter: 92.9 kg (204 lb 12.8 oz).         He reports that he quit smoking about 32 years ago. He has quit using smokeless tobacco.      Counseling Resources:  ATP IV Guidelines  Pooled Cohorts Equation Calculator  FRAX Risk Assessment  ICSI Preventive Guidelines  Dietary Guidelines for Americans, 2010  USDA's MyPlate  ASA Prophylaxis  Lung CA Screening    Guy Moseley MD  [facility]  "

## 2022-05-03 ENCOUNTER — TRANSFERRED RECORDS (OUTPATIENT)
Dept: HEALTH INFORMATION MANAGEMENT | Facility: CLINIC | Age: 62
End: 2022-05-03
Payer: COMMERCIAL

## 2022-10-26 ENCOUNTER — TELEPHONE (OUTPATIENT)
Dept: SLEEP MEDICINE | Facility: CLINIC | Age: 62
End: 2022-10-26

## 2022-10-31 DIAGNOSIS — E78.00 HYPERCHOLESTEROLEMIA: ICD-10-CM

## 2022-11-01 RX ORDER — SIMVASTATIN 20 MG
TABLET ORAL
Qty: 30 TABLET | Refills: 1 | Status: SHIPPED | OUTPATIENT
Start: 2022-11-01 | End: 2023-02-24

## 2022-11-01 NOTE — TELEPHONE ENCOUNTER
Called and left VM for patient to call our schedulers and schedule follow up visit with Dr. Keller.     Devorah Olivera CMA on 11/1/2022 at 2:01 PM

## 2022-11-01 NOTE — TELEPHONE ENCOUNTER
"Early Refill Request  Last Written Prescription Date:  1/6/22  Last Fill Quantity: 90,  # refills: 3   Last office visit provider:  1/6/22     Requested Prescriptions   Pending Prescriptions Disp Refills     simvastatin (ZOCOR) 20 MG tablet [Pharmacy Med Name: SIMVASTATIN 20MG TABLET** 20 Tablet] 30 tablet 1     Sig: TAKE 1 TABLET BY MOUTH EACH NIGHT AT BEDTIME       Statins Protocol Passed - 10/31/2022  5:08 PM        Passed - LDL on file in past 12 months     Recent Labs   Lab Test 01/06/22  0900                Passed - No abnormal creatine kinase in past 12 months     No lab results found.             Passed - Recent (12 mo) or future (30 days) visit within the authorizing provider's specialty     Patient has had an office visit with the authorizing provider or a provider within the authorizing providers department within the previous 12 mos or has a future within next 30 days. See \"Patient Info\" tab in inbasket, or \"Choose Columns\" in Meds & Orders section of the refill encounter.              Passed - Medication is active on med list        Passed - Patient is age 18 or older             Elana Deleon RN 11/01/22 2:52 PM  "

## 2022-11-20 ENCOUNTER — HEALTH MAINTENANCE LETTER (OUTPATIENT)
Age: 62
End: 2022-11-20

## 2022-12-19 NOTE — PROGRESS NOTES
Dear Dr. Ryan Hernandez Md  [address] [place] [address]    Thank you for the opportunity to participate in the care of MrOdell Zarate.    He is a 56 y.o. male who comes to the clinic for the transfer of care of his sleep issues.  The patient was diagnosed with severe obstructive sleep apnea on 16 which showed an apnea hypopnea index of 40.8 events per hour with lowest O2 sat of 80%.  The patient was placed on CPAP at a pressure of 8 cm water and have been doing well.  He also suffers from persistent insomnia making it difficult for him to initiate and maintain sleep.  He is also here to renew his prescription for Ambien if clinically indicated.  Patient review of system is otherwise unremarkable.     Ideal Sleep-Wake Cycle(devoid of societal pressure):    Patient would try to initiate sleep at around 10:30PM with a sleep latency of 30 minutes. The patient would have 1-2 nocturnal awakening. Final wake up time is around 6-6:30 AM approximately.    Compliance Download data for 30 Days:  Pressure settin cwp  Residual AHI:0.3 events per hour  Leak:Minimal  Compliance:97%  Mask Tolerance:Good  Skin irritation:None      Past Medical History  Past Medical History:   Diagnosis Date     Atrial fibrillation      Cancer     marginal zone non hodgkins lymphoma     Colitis      High cholesterol      JESSIE on CPAP         Past Surgical History  Past Surgical History:   Procedure Laterality Date     CARDIOVERSION  2017     OTHER SURGICAL HISTORY  2017    atrial fib ablation     IA EPHYS EVAL W/ABLATION SUPRAVENT ARRHYTHMIA  2017    Procedure: EP Ablation Atrial Flutter;  Surgeon: Jones Leonardo MD;  Location: [facility];  Service: Cardiology     IA EPHYS EVL TRNSPTL TX ATRIAL FIB ISOLAT PULM VEIN Left 2017    Procedure: EP Ablation PVI;  Surgeon: Jones Leonardo MD;  Location: [facility] Cath Lab;  Service: Cardiology        Meds  Current Outpatient Prescriptions    Medication Sig Dispense Refill     CALCIUM CITRATE/VITAMIN D3 (CITRACAL REGULAR ORAL) Take 1 tablet by mouth daily.       coQ10, ubiquinol, 100 mg cap Take 1 capsule by mouth daily.       DELZICOL 400 mg delayed-release capsule TAKE 2 CAPSULES BY MOUTH DAILY 180 capsule 2     ELIQUIS 5 mg Tab tablet TAKE 1 TABLET (5 MG) BY MOUTH TWO TIMES A DAY. 180 tablet 2     multivitamin capsule Take 1 capsule by mouth daily.       OMEGA-3/DHA/EPA/FISH OIL (FISH OIL-OMEGA-3 FATTY ACIDS) 300-1,000 mg capsule Take 2 g by mouth daily.       simvastatin (ZOCOR) 20 MG tablet TAKE 1 TABLET BY MOUTH EACH NIGHT AT BEDTIME 90 tablet 2     zolpidem (AMBIEN) 10 mg tablet Take 0.5 tablets (5 mg total) by mouth at bedtime as needed for sleep. 7 tablet 0     No current facility-administered medications for this visit.         Allergies  Review of patient's allergies indicates no known allergies.     Social History  Social History     Social History     Marital status:      Spouse name: N/A     Number of children: N/A     Years of education: N/A     Occupational History     manager      Social History Main Topics     Smoking status: Former Smoker     Quit date: 1/1/1990     Smokeless tobacco: Not on file      Comment: occasional cigar smoker     Alcohol use 2.4 oz/week     1 Cans of beer, 3 Shots of liquor per week      Comment: weekly     Drug use: No      Comment: Occasional use.     Sexual activity: Not on file     Other Topics Concern     Not on file     Social History Narrative        Family History  Family History   Problem Relation Age of Onset     Snoring Father      Leukemia Father      Lung cancer Mother      No Medical Problems Sister      Prostate cancer Brother      No Medical Problems Brother      Kidney disease Sister      No Medical Problems Sister         Review of Systems:  Constitutional: Negative except as noted in HPI.   Eyes: Negative except as noted in HPI.   ENT: Negative except as noted in HPI.  "  Cardiovascular: Negative except as noted in HPI.   Respiratory: Negative except as noted in HPI.   Gastrointestinal: Negative except as noted in HPI.   Genitourinary: Negative except as noted in HPI.   Musculoskeletal: Negative except as noted in HPI.   Integumentary: Negative except as noted in HPI.   Neurological: Negative except as noted in HPI.   Psychiatric: Negative except as noted in HPI.   Endocrine: Negative except as noted in HPI.   Hematologic/Lymphatic: Negative except as noted in HPI.      Physical Exam:  /68  Pulse 79  Ht 5' 11\" (1.803 m)  Wt 209 lb (94.8 kg)  SpO2 96%  BMI 29.15 kg/m2  BMI:Body mass index is 29.15 kg/(m^2).   GEN: NAD, appropriate for age  Head: Normocephalic.  EYES: PERRLA, EOMI  ENT: Oropharynx is clear, mallampatti class 4+ airway.   Nasal mucosa is moist without erythema  Neck : Thyroid is within normal limits.  CV: Regular rate and rhythm, S1 & S2 positive.  LUNGS: Bilateral breathsounds heard.   ABDOMEN: Positive bowel sounds in all quadrants, soft, no rebound or guarding  MUSCULOSKELETAL: No leg swelling  SKIN: warm, dry, no rashes  Neurological: Alert, oriented to time, place, and person.  Psych: normal mood, normal affect     Labs/Studies:     Lab Results   Component Value Date    WBC 15.4 (H) 04/19/2017    HGB 17.1 04/19/2017    HCT 51.1 04/19/2017    MCV 96 04/19/2017     04/19/2017         Chemistry        Component Value Date/Time     04/19/2017 0650    K 4.2 04/19/2017 0650     04/19/2017 0650    CO2 24 04/19/2017 0650    BUN 18 04/19/2017 0650    CREATININE 0.93 04/19/2017 0650     04/19/2017 0650        Component Value Date/Time    CALCIUM 8.8 04/19/2017 0650    ALKPHOS 71 11/16/2016 1505    AST 16 11/16/2016 1505    ALT 29 11/16/2016 1505    BILITOT 0.5 11/16/2016 1505            No results found for: FERRITIN  Lab Results   Component Value Date    TSH 2.60 01/11/2016         Assessment and Plan:  In summary Rik Zarate is a 56 " y.o. year old male here for transfer of care.  1.  Obstructive sleep apnea on CPAP  I congratulated the patient on his excellent usage.  I will put the patient on a pressure range of 8-12 CWP due to the fact that he sometimes feel that there is not enough air.  2.  Hypersomnia  Despite optimal usage she still continues to have occasional episodes of sleepiness during the day.  I advised the patient to increase his daily hours of sleep by 1 more hour and also encouraged him to nap with the machine during the day on his days off.  3.  Persistent insomnia  I will renew the patient's Ambien.  I will also give him a handout on insomnia in general.  4.  Other sleep disturbance    Patient verbalized understanding of these issues, agrees with the plan and all questions were answered today. Patient was given an opportuntity to voice any other symptoms or concerns not listed above. Patient did not have any other symptoms or concerns.      Patient told to return in one week after the sleep study is interpreted. Patient instructed to stop at  to schedule appointment before leaving today.       Mahesh Keller DO  Board Certified in Internal Medicine and Sleep Medicine  [facility].    (Note created with Dragon voice recognition and unintended spelling errors and word substitutions may occur)      [facility]

## 2023-02-24 ENCOUNTER — MYC MEDICAL ADVICE (OUTPATIENT)
Dept: INTERNAL MEDICINE | Facility: CLINIC | Age: 63
End: 2023-02-24

## 2023-02-24 DIAGNOSIS — E78.00 HYPERCHOLESTEROLEMIA: ICD-10-CM

## 2023-02-25 NOTE — TELEPHONE ENCOUNTER
"Routing refill request to provider for review/approval because:  Labs not current:  ldl  Patient needs to be seen because it has been more than 1 year since last office visit.    Last Written Prescription Date:  11/1/22  Last Fill Quantity: 30,  # refills: 1   Last office visit provider:  1/6/22     Requested Prescriptions   Pending Prescriptions Disp Refills     simvastatin (ZOCOR) 20 MG tablet 30 tablet 1     Sig: Take 1 tablet by mouth each night at bedtime       Statins Protocol Failed - 2/24/2023  9:52 AM        Failed - LDL on file in past 12 months     Recent Labs   Lab Test 01/06/22  0900                Failed - Recent (12 mo) or future (30 days) visit within the authorizing provider's specialty     Patient has had an office visit with the authorizing provider or a provider within the authorizing providers department within the previous 12 mos or has a future within next 30 days. See \"Patient Info\" tab in inbasket, or \"Choose Columns\" in Meds & Orders section of the refill encounter.              Passed - No abnormal creatine kinase in past 12 months     No lab results found.             Passed - Medication is active on med list        Passed - Patient is age 18 or older             Jelena Cobos, RN 02/25/23 2:22 PM  "

## 2023-02-27 RX ORDER — SIMVASTATIN 20 MG
TABLET ORAL
Qty: 90 TABLET | Refills: 0 | Status: SHIPPED | OUTPATIENT
Start: 2023-02-27 | End: 2023-05-31

## 2023-04-15 ENCOUNTER — HEALTH MAINTENANCE LETTER (OUTPATIENT)
Age: 63
End: 2023-04-15

## 2023-05-16 ENCOUNTER — TRANSFERRED RECORDS (OUTPATIENT)
Dept: HEALTH INFORMATION MANAGEMENT | Facility: CLINIC | Age: 63
End: 2023-05-16
Payer: COMMERCIAL

## 2023-05-30 ASSESSMENT — ENCOUNTER SYMPTOMS
ABDOMINAL PAIN: 0
DIARRHEA: 0
EYE PAIN: 0
NERVOUS/ANXIOUS: 0
MYALGIAS: 0
NAUSEA: 0
JOINT SWELLING: 0
HEMATOCHEZIA: 1
CONSTIPATION: 0
DYSURIA: 0
COUGH: 0
PALPITATIONS: 0
FEVER: 0
FREQUENCY: 0
ARTHRALGIAS: 1
HEADACHES: 0
DIZZINESS: 0
HEMATURIA: 0
CHILLS: 0
PARESTHESIAS: 0
SORE THROAT: 0
WEAKNESS: 0
SHORTNESS OF BREATH: 0
HEARTBURN: 0

## 2023-05-31 ENCOUNTER — TELEPHONE (OUTPATIENT)
Dept: FAMILY MEDICINE | Facility: OTHER | Age: 63
End: 2023-05-31

## 2023-05-31 ENCOUNTER — OFFICE VISIT (OUTPATIENT)
Dept: INTERNAL MEDICINE | Facility: CLINIC | Age: 63
End: 2023-05-31
Payer: COMMERCIAL

## 2023-05-31 VITALS
SYSTOLIC BLOOD PRESSURE: 118 MMHG | HEART RATE: 60 BPM | RESPIRATION RATE: 16 BRPM | TEMPERATURE: 98.2 F | OXYGEN SATURATION: 96 % | HEIGHT: 71 IN | DIASTOLIC BLOOD PRESSURE: 58 MMHG | BODY MASS INDEX: 29.82 KG/M2 | WEIGHT: 213 LBS

## 2023-05-31 DIAGNOSIS — Z12.5 SPECIAL SCREENING FOR MALIGNANT NEOPLASM OF PROSTATE: ICD-10-CM

## 2023-05-31 DIAGNOSIS — E78.00 PURE HYPERCHOLESTEROLEMIA: ICD-10-CM

## 2023-05-31 DIAGNOSIS — Z00.00 ANNUAL PHYSICAL EXAM: ICD-10-CM

## 2023-05-31 DIAGNOSIS — E78.00 HYPERCHOLESTEROLEMIA: ICD-10-CM

## 2023-05-31 DIAGNOSIS — Z11.59 NEED FOR HEPATITIS C SCREENING TEST: ICD-10-CM

## 2023-05-31 DIAGNOSIS — I48.19 PERSISTENT ATRIAL FIBRILLATION (H): ICD-10-CM

## 2023-05-31 DIAGNOSIS — Z11.4 SCREENING FOR HIV (HUMAN IMMUNODEFICIENCY VIRUS): ICD-10-CM

## 2023-05-31 DIAGNOSIS — R00.2 PALPITATIONS: ICD-10-CM

## 2023-05-31 DIAGNOSIS — R73.01 ELEVATED FASTING GLUCOSE: ICD-10-CM

## 2023-05-31 DIAGNOSIS — R73.01 ELEVATED FASTING GLUCOSE: Primary | ICD-10-CM

## 2023-05-31 DIAGNOSIS — K52.9 COLITIS: ICD-10-CM

## 2023-05-31 DIAGNOSIS — Z98.890 STATUS POST CATHETER ABLATION OF ATRIAL FIBRILLATION: ICD-10-CM

## 2023-05-31 DIAGNOSIS — C85.80 MARGINAL ZONE LYMPHOMA (H): Primary | ICD-10-CM

## 2023-05-31 DIAGNOSIS — G47.33 OSA ON CPAP: ICD-10-CM

## 2023-05-31 DIAGNOSIS — Z12.11 SCREEN FOR COLON CANCER: ICD-10-CM

## 2023-05-31 LAB
ALBUMIN SERPL BCG-MCNC: 4.3 G/DL (ref 3.5–5.2)
ALP SERPL-CCNC: 65 U/L (ref 40–129)
ALT SERPL W P-5'-P-CCNC: 40 U/L (ref 10–50)
ANION GAP SERPL CALCULATED.3IONS-SCNC: 14 MMOL/L (ref 7–15)
AST SERPL W P-5'-P-CCNC: 39 U/L (ref 10–50)
BILIRUB SERPL-MCNC: 0.5 MG/DL
BUN SERPL-MCNC: 19.3 MG/DL (ref 8–23)
CALCIUM SERPL-MCNC: 9.6 MG/DL (ref 8.8–10.2)
CHLORIDE SERPL-SCNC: 103 MMOL/L (ref 98–107)
CHOLEST SERPL-MCNC: 152 MG/DL
CREAT SERPL-MCNC: 0.86 MG/DL (ref 0.67–1.17)
DEPRECATED HCO3 PLAS-SCNC: 22 MMOL/L (ref 22–29)
ERYTHROCYTE [DISTWIDTH] IN BLOOD BY AUTOMATED COUNT: 13.9 % (ref 10–15)
GFR SERPL CREATININE-BSD FRML MDRD: >90 ML/MIN/1.73M2
GLUCOSE SERPL-MCNC: 122 MG/DL (ref 70–99)
HBA1C MFR BLD: 6.5 % (ref 0–5.6)
HCT VFR BLD AUTO: 46.9 % (ref 40–53)
HDLC SERPL-MCNC: 46 MG/DL
HGB BLD-MCNC: 15.8 G/DL (ref 13.3–17.7)
LDLC SERPL CALC-MCNC: 80 MG/DL
MCH RBC QN AUTO: 32.1 PG (ref 26.5–33)
MCHC RBC AUTO-ENTMCNC: 33.7 G/DL (ref 31.5–36.5)
MCV RBC AUTO: 95 FL (ref 78–100)
NONHDLC SERPL-MCNC: 106 MG/DL
PLATELET # BLD AUTO: 239 10E3/UL (ref 150–450)
POTASSIUM SERPL-SCNC: 4.3 MMOL/L (ref 3.4–5.3)
PROT SERPL-MCNC: 7 G/DL (ref 6.4–8.3)
PSA SERPL DL<=0.01 NG/ML-MCNC: 1.13 NG/ML (ref 0–4.5)
RBC # BLD AUTO: 4.92 10E6/UL (ref 4.4–5.9)
SODIUM SERPL-SCNC: 139 MMOL/L (ref 136–145)
TRIGL SERPL-MCNC: 131 MG/DL
WBC # BLD AUTO: 12 10E3/UL (ref 4–11)

## 2023-05-31 PROCEDURE — 80061 LIPID PANEL: CPT | Performed by: NURSE PRACTITIONER

## 2023-05-31 PROCEDURE — 85027 COMPLETE CBC AUTOMATED: CPT | Performed by: NURSE PRACTITIONER

## 2023-05-31 PROCEDURE — G0103 PSA SCREENING: HCPCS | Performed by: NURSE PRACTITIONER

## 2023-05-31 PROCEDURE — 83036 HEMOGLOBIN GLYCOSYLATED A1C: CPT | Performed by: NURSE PRACTITIONER

## 2023-05-31 PROCEDURE — 80053 COMPREHEN METABOLIC PANEL: CPT | Performed by: NURSE PRACTITIONER

## 2023-05-31 PROCEDURE — 99396 PREV VISIT EST AGE 40-64: CPT | Performed by: NURSE PRACTITIONER

## 2023-05-31 PROCEDURE — 36415 COLL VENOUS BLD VENIPUNCTURE: CPT | Performed by: NURSE PRACTITIONER

## 2023-05-31 PROCEDURE — 99214 OFFICE O/P EST MOD 30 MIN: CPT | Mod: 25 | Performed by: NURSE PRACTITIONER

## 2023-05-31 RX ORDER — SODIUM FLUORIDE AND POTASSIUM NITRATE 5.8; 57.5 MG/ML; MG/ML
GEL, DENTIFRICE DENTAL
COMMUNITY
Start: 2022-08-24 | End: 2024-09-17

## 2023-05-31 RX ORDER — SIMVASTATIN 20 MG
TABLET ORAL
Qty: 90 TABLET | Refills: 0 | Status: SHIPPED | OUTPATIENT
Start: 2023-05-31 | End: 2023-09-25

## 2023-05-31 RX ORDER — MESALAMINE 400 MG/1
800 CAPSULE, DELAYED RELEASE ORAL 2 TIMES DAILY
Qty: 360 CAPSULE | Refills: 1 | Status: SHIPPED | OUTPATIENT
Start: 2023-05-31 | End: 2024-09-17

## 2023-05-31 ASSESSMENT — ENCOUNTER SYMPTOMS
DYSURIA: 0
DIARRHEA: 0
COUGH: 0
EYE PAIN: 0
ARTHRALGIAS: 1
FEVER: 0
CONSTIPATION: 0
HEADACHES: 0
WEAKNESS: 0
HEMATURIA: 0
CHILLS: 0
ABDOMINAL PAIN: 0
HEARTBURN: 0
SORE THROAT: 0
PALPITATIONS: 0
HEMATOCHEZIA: 1
JOINT SWELLING: 0
NAUSEA: 0
NERVOUS/ANXIOUS: 0
DIZZINESS: 0
FREQUENCY: 0
MYALGIAS: 0
SHORTNESS OF BREATH: 0
PARESTHESIAS: 0

## 2023-05-31 NOTE — PATIENT INSTRUCTIONS
Blood pressure and other vital signs look good.    Continue to work on diet and exercise changes for weight loss.    CPAP machine prescription printed for you.    We will check a variety of labs today.  Results will come through on [employer].    Shingrix shingles vaccine can be obtained at a local pharmacy.    Increase in mesalamine medication but if symptoms persist, need to follow-up with gastroenterology.    Refills of simvastatin.    Call the cardiology clinic to set up a 7-day cardiac event monitor.  [phone].  You could consider getting an Apple Watch for continuous cardiac monitoring.    Call [facility] to set up the colonoscopy.  [phone].    Follow-up in 1 year, sooner if needed

## 2023-05-31 NOTE — PROGRESS NOTES
SUBJECTIVE:   CC: Joey is an 62 year old who presents for preventative health visit.       2023     7:08 AM   Additional Questions   Roomed by    Patient has been advised of split billing requirements and indicates understanding: Yes  Healthy Habits:     Getting at least 3 servings of Calcium per day:  Yes    Bi-annual eye exam:  Yes    Dental care twice a year:  Yes    Sleep apnea or symptoms of sleep apnea:  Sleep apnea    Diet:  Regular (no restrictions)    Frequency of exercise:  2-3 days/week    Duration of exercise:  15-30 minutes    Taking medications regularly:  Yes    PHQ-2 Total Score: 0        Today's PHQ-2 Score:       2023     6:47 PM   PHQ-2 (  [employer])   Q1: Little interest or pleasure in doing things 0   Q2: Feeling down, depressed or hopeless 0   PHQ-2 Score 0   Q1: Little interest or pleasure in doing things Not at all   Q2: Feeling down, depressed or hopeless Not at all   PHQ-2 Score 0           Social History     Tobacco Use     Smoking status: Former     Types: Cigarettes     Quit date: 1990     Years since quittin.4     Smokeless tobacco: Former     Tobacco comments:     occasional cigar smoker   Vaping Use     Vaping status: Not on file   Substance Use Topics     Alcohol use: Yes     Alcohol/week: 4.0 standard drinks of alcohol     Comment: Alcoholic Drinks/day: weekly             2023     6:47 PM   Alcohol Use   Prescreen: >3 drinks/day or >7 drinks/week? Yes   AUDIT SCORE  5       Last PSA:   Prostate Specific Antigen Screen   Date Value Ref Range Status   2022 1.32 0.00 - 4.50 ug/L Final       Reviewed orders with patient. Reviewed health maintenance and updated orders accordingly - Yes  Lab work is in process  Labs reviewed in EPIC    Reviewed and updated as needed this visit by clinical staff   Tobacco  Allergies  Meds              Reviewed and updated as needed this visit by Provider                 Past Medical History:   Diagnosis Date     Atrial  "fibrillation (H) 2016     Atypical atrial flutter (H) 2016     Cancer (H) 2009    marginal zone non hodgkins lymphoma     Colitis 2009     High cholesterol 1984     JESSIE on CPAP 2017        Review of Systems   Constitutional: Negative for chills and fever.   HENT: Negative for congestion, ear pain, hearing loss and sore throat.    Eyes: Negative for pain and visual disturbance.   Respiratory: Negative for cough and shortness of breath.    Cardiovascular: Negative for chest pain, palpitations and peripheral edema.   Gastrointestinal: Positive for hematochezia. Negative for abdominal pain, constipation, diarrhea, heartburn and nausea.   Genitourinary: Negative for dysuria, frequency, genital sores, hematuria, impotence, penile discharge and urgency.   Musculoskeletal: Positive for arthralgias. Negative for joint swelling and myalgias.   Skin: Negative for rash.   Neurological: Negative for dizziness, weakness, headaches and paresthesias.   Psychiatric/Behavioral: Positive for mood changes. The patient is not nervous/anxious.      CONSTITUTIONAL: NEGATIVE for fever, chills, change in weight  INTEGUMENTARY/SKIN: NEGATIVE for worrisome rashes, moles or lesions  EYES: NEGATIVE for vision changes or irritation  ENT: NEGATIVE for ear, mouth and throat problems  RESP: NEGATIVE for significant cough or SOB  CV: NEGATIVE for chest pain, palpitations or peripheral edema  GI: NEGATIVE for nausea, abdominal pain, heartburn, or change in bowel habits   male: negative for dysuria, hematuria, decreased urinary stream, erectile dysfunction, urethral discharge  MUSCULOSKELETAL: NEGATIVE for significant arthralgias or myalgia  NEURO: NEGATIVE for weakness, dizziness or paresthesias  PSYCHIATRIC: NEGATIVE for changes in mood or affect    OBJECTIVE:   /58 (BP Location: Right arm, Patient Position: Sitting, Cuff Size: Adult Regular)   Pulse 60   Temp 98.2  F (36.8  C) (Oral)   Resp 16   Ht 1.803 m (5' 11\")   Wt 96.6 kg (213 " lb)   SpO2 96%   BMI 29.71 kg/m      Physical Exam  GENERAL: healthy, alert and no distress  NECK: no adenopathy, no asymmetry, masses, or scars and thyroid normal to palpation  RESP: lungs clear to auscultation - no rales, rhonchi or wheezes  CV: regular rate and rhythm, normal S1 S2, no S3 or S4, no murmur, click or rub, no peripheral edema and peripheral pulses strong  ABDOMEN: soft, nontender, no hepatosplenomegaly, no masses and bowel sounds normal  MS: no gross musculoskeletal defects noted, no edema    Diagnostic Test Results:  Labs reviewed in Epic    ASSESSMENT/PLAN:     1. Annual physical exam  Chronic medical issues reviewed    2. Screen for colon cancer  - Colonoscopy Screening  Referral; Future    3. Screening for HIV (human immunodeficiency virus)  Low risk patient    4. Need for hepatitis C screening test  Low risk patient    5. Colitis  Past history of diverticular associated colitis with bleeding.  He takes mesalamine daily to prevent this which works well for him.  He has noticed recently that there is been a bit more blood in his stool would like to get caught up on his screening colonoscopies.    He also would like to consider increasing his mesalamine; currently taking 3 capsules/day but would like to increase to 4.  I told him that if his symptoms persist, he will need to follow-up with gastroenterology    - mesalamine (DELZICOL) 400 MG DR capsule; Take 2 capsules (800 mg) by mouth 2 times daily  Dispense: 360 capsule; Refill: 1    6. Marginal zone lymphoma (H)  Yearly follow-up with [facility] was done this past May.  Diagnosed in 2009.  This has been stable with no lymphadenopathy or night sweats.    7. Status post catheter ablation of atrial fibrillation  Paroxysmal atrial fibrillation with no recurrence since ablation in 2017.  Although, he has mentioned some palpitations recently in the morning.  No longer on Eliquis but does take baby aspirin.    We talked about getting  "a 7-day cardiac monitor and obtaining an Apple Watch for continued maintenance surveillance    8. Persistent atrial fibrillation (H)  As above    9. Hypercholesterolemia  Continues on simvastatin and baby aspirin.  Low HDL.  2005 cardiac CT scan with a score of 0  - Comprehensive metabolic panel; Future  - CBC with platelets; Future  - Lipid Profile (Chol, Trig, HDL, LDL calc); Future    10. JESSIE on CPAP  Very compliant with his CPAP machine and is looking for a prescription so that he can get an additional machine for his cabin  - CPAP Order for DME - ONLY FOR DME    11. Hypercholesterolemia  - simvastatin (ZOCOR) 20 MG tablet; Take 1 tablet by mouth each night at bedtime  Dispense: 90 tablet; Refill: 0    12. Special screening for malignant neoplasm of prostate  - PSA, screen; Future    13. Palpitations  - Adult Cardiac Event Monitor; Future    Patient Instructions   Blood pressure and other vital signs look good.    Continue to work on diet and exercise changes for weight loss.    CPAP machine prescription printed for you.    We will check a variety of labs today.  Results will come through on [employer].    Shingrix shingles vaccine can be obtained at a local pharmacy.    Increase in mesalamine medication but if symptoms persist, need to follow-up with gastroenterology.    Refills of simvastatin.    Call the cardiology clinic to set up a 7-day cardiac event monitor.  [phone].  You could consider getting an Apple Watch for continuous cardiac monitoring.    Call [facility] to set up the colonoscopy.  [phone].    Follow-up in 1 year, sooner if needed            COUNSELING:   Reviewed preventive health counseling, as reflected in patient instructions       Regular exercise       Healthy diet/nutrition       Vision screening       Hearing screening      BMI:   Estimated body mass index is 29.71 kg/m  as calculated from the following:    Height as of this encounter: 1.803 m (5' 11\").    Weight as " of this encounter: 96.6 kg (213 lb).   Weight management plan: Discussed healthy diet and exercise guidelines      He reports that he quit smoking about 33 years ago. He has quit using smokeless tobacco.        Darius Anderson CNP  [facility]

## 2023-06-20 ENCOUNTER — ANCILLARY PROCEDURE (OUTPATIENT)
Dept: CARDIOLOGY | Facility: CLINIC | Age: 63
End: 2023-06-20
Attending: NURSE PRACTITIONER
Payer: COMMERCIAL

## 2023-06-20 DIAGNOSIS — R00.2 PALPITATIONS: ICD-10-CM

## 2023-06-20 PROCEDURE — 93270 REMOTE 30 DAY ECG REV/REPORT: CPT

## 2023-06-20 PROCEDURE — 93272 ECG/REVIEW INTERPRET ONLY: CPT | Performed by: INTERNAL MEDICINE

## 2023-06-20 NOTE — PROGRESS NOTES
Per Dr. Anderson, patient to have Zio monitor placed.  Diagnosis: palpitations  Monitor placed: Yes  Patient Instructed: Yes  Patient verbalized understanding: Yes  Holter # [id number]

## 2023-08-30 ENCOUNTER — TRANSFERRED RECORDS (OUTPATIENT)
Dept: HEALTH INFORMATION MANAGEMENT | Facility: CLINIC | Age: 63
End: 2023-08-30
Payer: COMMERCIAL

## 2023-09-25 ENCOUNTER — MYC MEDICAL ADVICE (OUTPATIENT)
Dept: INTERNAL MEDICINE | Facility: CLINIC | Age: 63
End: 2023-09-25
Payer: COMMERCIAL

## 2023-09-25 DIAGNOSIS — L98.9 SKIN LESION: Primary | ICD-10-CM

## 2023-09-26 ENCOUNTER — TRANSFERRED RECORDS (OUTPATIENT)
Dept: HEALTH INFORMATION MANAGEMENT | Facility: CLINIC | Age: 63
End: 2023-09-26
Payer: COMMERCIAL

## 2023-09-26 LAB
CREATININE (EXTERNAL): 0.8 MG/DL (ref 0.76–1.27)
GFR ESTIMATED (EXTERNAL): 99 ML/MIN/1.73
GLUCOSE (EXTERNAL): 94 MG/DL (ref 70–99)
POTASSIUM (EXTERNAL): 4.6 MMOL/L (ref 3.5–5.2)

## 2023-11-07 ENCOUNTER — TRANSFERRED RECORDS (OUTPATIENT)
Dept: HEALTH INFORMATION MANAGEMENT | Facility: CLINIC | Age: 63
End: 2023-11-07
Payer: COMMERCIAL

## 2023-11-14 ENCOUNTER — TRANSFERRED RECORDS (OUTPATIENT)
Dept: HEALTH INFORMATION MANAGEMENT | Facility: CLINIC | Age: 63
End: 2023-11-14
Payer: COMMERCIAL

## 2024-02-27 ENCOUNTER — TELEPHONE (OUTPATIENT)
Dept: NURSING | Facility: CLINIC | Age: 64
End: 2024-02-27
Payer: COMMERCIAL

## 2024-02-27 NOTE — TELEPHONE ENCOUNTER
"Telephone Call:     Pt calling from [place] to report that he hurt his ribs in the \"back side\" while golfing and he is wondering how to proceed. He states he has some pain with certain movements and taking a deep breath.     Pt was advised that contact a clinic in [place] to speak with their triage nurse to get the full advisement as [facility] does not have nursing licenses in [place].     Pt verbalized understanding and states that he plans to seek medical advice in [place].     Chantelle Momin RN  [facility] Nurse Advisor 2:14 PM 2/27/2024  "

## 2024-05-23 DIAGNOSIS — E78.00 HYPERCHOLESTEROLEMIA: ICD-10-CM

## 2024-05-24 RX ORDER — SIMVASTATIN 20 MG
TABLET ORAL
Qty: 90 TABLET | Refills: 0 | Status: SHIPPED | OUTPATIENT
Start: 2024-05-24 | End: 2024-09-17

## 2024-08-07 ENCOUNTER — TRANSFERRED RECORDS (OUTPATIENT)
Dept: HEALTH INFORMATION MANAGEMENT | Facility: CLINIC | Age: 64
End: 2024-08-07
Payer: COMMERCIAL

## 2024-08-31 ENCOUNTER — HEALTH MAINTENANCE LETTER (OUTPATIENT)
Age: 64
End: 2024-08-31

## 2024-09-17 ENCOUNTER — OFFICE VISIT (OUTPATIENT)
Dept: INTERNAL MEDICINE | Facility: CLINIC | Age: 64
End: 2024-09-17
Payer: COMMERCIAL

## 2024-09-17 VITALS
BODY MASS INDEX: 30.52 KG/M2 | HEIGHT: 71 IN | HEART RATE: 70 BPM | SYSTOLIC BLOOD PRESSURE: 128 MMHG | RESPIRATION RATE: 16 BRPM | OXYGEN SATURATION: 97 % | DIASTOLIC BLOOD PRESSURE: 72 MMHG | TEMPERATURE: 97.9 F | WEIGHT: 218 LBS

## 2024-09-17 DIAGNOSIS — K51.90 ULCERATIVE COLITIS WITHOUT COMPLICATIONS, UNSPECIFIED LOCATION (H): ICD-10-CM

## 2024-09-17 DIAGNOSIS — K52.9 COLITIS: ICD-10-CM

## 2024-09-17 DIAGNOSIS — Z11.4 SCREENING FOR HIV (HUMAN IMMUNODEFICIENCY VIRUS): Primary | ICD-10-CM

## 2024-09-17 DIAGNOSIS — E78.00 PURE HYPERCHOLESTEROLEMIA: ICD-10-CM

## 2024-09-17 DIAGNOSIS — Z11.59 NEED FOR HEPATITIS C SCREENING TEST: ICD-10-CM

## 2024-09-17 DIAGNOSIS — Z12.5 SCREENING FOR PROSTATE CANCER: ICD-10-CM

## 2024-09-17 DIAGNOSIS — J30.2 SEASONAL ALLERGIC RHINITIS, UNSPECIFIED TRIGGER: ICD-10-CM

## 2024-09-17 DIAGNOSIS — E11.9 TYPE 2 DIABETES MELLITUS WITHOUT COMPLICATION, WITHOUT LONG-TERM CURRENT USE OF INSULIN (H): ICD-10-CM

## 2024-09-17 DIAGNOSIS — Z00.00 ANNUAL PHYSICAL EXAM: ICD-10-CM

## 2024-09-17 DIAGNOSIS — G47.33 OSA ON CPAP: ICD-10-CM

## 2024-09-17 DIAGNOSIS — C85.80 MARGINAL ZONE LYMPHOMA (H): ICD-10-CM

## 2024-09-17 DIAGNOSIS — E78.00 HYPERCHOLESTEROLEMIA: ICD-10-CM

## 2024-09-17 LAB
ALBUMIN SERPL BCG-MCNC: 4.5 G/DL (ref 3.5–5.2)
ALP SERPL-CCNC: 62 U/L (ref 40–150)
ALT SERPL W P-5'-P-CCNC: 42 U/L (ref 0–70)
ANION GAP SERPL CALCULATED.3IONS-SCNC: 9 MMOL/L (ref 7–15)
AST SERPL W P-5'-P-CCNC: 31 U/L (ref 0–45)
BILIRUB SERPL-MCNC: 0.4 MG/DL
BUN SERPL-MCNC: 19.1 MG/DL (ref 8–23)
CALCIUM SERPL-MCNC: 9 MG/DL (ref 8.8–10.4)
CHLORIDE SERPL-SCNC: 104 MMOL/L (ref 98–107)
CHOLEST SERPL-MCNC: 197 MG/DL
CREAT SERPL-MCNC: 0.85 MG/DL (ref 0.67–1.17)
EGFRCR SERPLBLD CKD-EPI 2021: >90 ML/MIN/1.73M2
FASTING STATUS PATIENT QL REPORTED: NO
FASTING STATUS PATIENT QL REPORTED: NO
GLUCOSE SERPL-MCNC: 95 MG/DL (ref 70–99)
HBA1C MFR BLD: 6.9 % (ref 0–5.6)
HCO3 SERPL-SCNC: 27 MMOL/L (ref 22–29)
HDLC SERPL-MCNC: 46 MG/DL
LDLC SERPL CALC-MCNC: 98 MG/DL
LDLC SERPL DIRECT ASSAY-MCNC: 120 MG/DL
NONHDLC SERPL-MCNC: 151 MG/DL
POTASSIUM SERPL-SCNC: 4.3 MMOL/L (ref 3.4–5.3)
PROT SERPL-MCNC: 7.3 G/DL (ref 6.4–8.3)
PSA SERPL DL<=0.01 NG/ML-MCNC: 1.09 NG/ML (ref 0–4.5)
SODIUM SERPL-SCNC: 140 MMOL/L (ref 135–145)
TRIGL SERPL-MCNC: 264 MG/DL

## 2024-09-17 PROCEDURE — 36415 COLL VENOUS BLD VENIPUNCTURE: CPT | Performed by: NURSE PRACTITIONER

## 2024-09-17 PROCEDURE — 99396 PREV VISIT EST AGE 40-64: CPT | Performed by: NURSE PRACTITIONER

## 2024-09-17 PROCEDURE — 83036 HEMOGLOBIN GLYCOSYLATED A1C: CPT | Performed by: NURSE PRACTITIONER

## 2024-09-17 PROCEDURE — 80053 COMPREHEN METABOLIC PANEL: CPT | Performed by: NURSE PRACTITIONER

## 2024-09-17 PROCEDURE — 80061 LIPID PANEL: CPT | Performed by: NURSE PRACTITIONER

## 2024-09-17 PROCEDURE — 99214 OFFICE O/P EST MOD 30 MIN: CPT | Mod: 25 | Performed by: NURSE PRACTITIONER

## 2024-09-17 PROCEDURE — 83721 ASSAY OF BLOOD LIPOPROTEIN: CPT | Performed by: NURSE PRACTITIONER

## 2024-09-17 PROCEDURE — G0103 PSA SCREENING: HCPCS | Performed by: NURSE PRACTITIONER

## 2024-09-17 RX ORDER — SIMVASTATIN 20 MG
TABLET ORAL
Qty: 90 TABLET | Refills: 0 | Status: SHIPPED | OUTPATIENT
Start: 2024-09-17

## 2024-09-17 RX ORDER — LORATADINE 10 MG/1
10 TABLET ORAL DAILY
Qty: 90 TABLET | Refills: 3 | Status: SHIPPED | OUTPATIENT
Start: 2024-09-17

## 2024-09-17 RX ORDER — MESALAMINE 400 MG/1
800 CAPSULE, DELAYED RELEASE ORAL 2 TIMES DAILY
Qty: 360 CAPSULE | Refills: 1 | Status: SHIPPED | OUTPATIENT
Start: 2024-09-17

## 2024-09-17 NOTE — PATIENT INSTRUCTIONS
Blood pressure and other vital signs look good.    Work on improving diet and exercise regimen.    Recheck hemoglobin A1c.  If you are still in type II diabetic range, we can try sending in a prescription for the Ozempic.    Make sure you schedule your follow-up colonoscopy.    Follow-up in 1 year, sooner if needed; we will follow-up sooner if we can get the Ozempic on board and covered

## 2024-09-17 NOTE — PROGRESS NOTES
Preventive Care Visit  [facility]  Darius Anderson, CNP, Nurse Practitioner - Family  Sep 17, 2024      Assessment & Plan     Annual physical exam  Joey seems to be doing okay.  His weight is up a bit this year.  He is a bit frustrated by that as he does exercise on a somewhat regular basis.  We talked about how he could consider decreasing his alcohol consumption a bit.    Screening for HIV (human immunodeficiency virus)  Low risk patient    Need for hepatitis C screening test  Low risk patient    Pure hypercholesterolemia  Labs were a bit elevated today but he is not fasting.  He has been on simvastatin 20 mg for years without difficulty.  - Lipid panel reflex to direct LDL Non-fasting; Future  - Lipid panel reflex to direct LDL Non-fasting    Hypercholesterolemia  As above  - simvastatin (ZOCOR) 20 MG tablet; Take 1 tablet by mouth each night at bedtime  - Comprehensive metabolic panel; Future  - LDL cholesterol direct; Future  - Comprehensive metabolic panel  - LDL cholesterol direct    Colitis  He sees a gastroenterologist at [facility].  On mesalamine twice daily.  He will increase this at times if he notices blood in his stool.  He will see blood in his stool if he drinks excessive alcohol  - mesalamine (DELZICOL) 400 MG DR capsule; Take 2 capsules (800 mg) by mouth 2 times daily.    JESSIE on CPAP  Very compliant with CPAP    Type 2 diabetes mellitus without complication, without long-term current use of insulin (H)  Relatively new diagnosis with hemoglobin A1c elevated last year at 6.5%.  Came back even more elevated at 6.9%.  With his BMI of 31, we talked about trial of Ozempic which she is agreeable to.    If he is able to start the Ozempic, I would like him to follow-up with me in the short-term to discuss whether or not he can continue tolerating this medication from a GI perspective given his history of colitis.    No history of pancreatitis or thyroid issues  -  "Hemoglobin A1c; Future  - Hemoglobin A1c    Screening for prostate cancer  - PSA, screen; Future  - PSA, screen    Seasonal allergic rhinitis, unspecified trigger  - loratadine (CLARITIN) 10 MG tablet; Take 1 tablet (10 mg) by mouth daily.    Ulcerative colitis without complications, unspecified location (H)  As above    Marginal zone lymphoma (H)  He sees [facility] on a regular basis.  Things have been quite stable.    Patient Instructions   Blood pressure and other vital signs look good.    Work on improving diet and exercise regimen.    Recheck hemoglobin A1c.  If you are still in type II diabetic range, we can try sending in a prescription for the Ozempic.    Make sure you schedule your follow-up colonoscopy.    Follow-up in 1 year, sooner if needed; we will follow-up sooner if we can get the Ozempic on board and covered              BMI  Estimated body mass index is 30.84 kg/m  as calculated from the following:    Height as of this encounter: 1.791 m (5' 10.5\").    Weight as of this encounter: 98.9 kg (218 lb).       Counseling  Appropriate preventive services were addressed with this patient via screening, questionnaire, or discussion as appropriate for fall prevention, nutrition, physical activity, Tobacco-use cessation, social engagement, weight loss and cognition.  Checklist reviewing preventive services available has been given to the patient.  Reviewed patient's diet, addressing concerns and/or questions.   He is at risk for lack of exercise and has been provided with information to increase physical activity for the benefit of his well-being.   The patient reports drinking more than 3 alcoholic drinks per day and/or more than 7 drhnks per week. The patient was counseled and given information about possible harmful effects of excessive alcohol intake.        Tolu Cook is a 63 year old, presenting for the following:  Physical (Pt is not fasting- left wrist and knuckle pain)        9/17/2024 "    11:40 AM   Additional Questions   Roomed by Ilsa MONTES        Health Care Directive  Patient does not have a Health Care Directive or Living Will: Patient states has Advance Directive and will bring in a copy to clinic.    HPI  Here for annual physical            9/13/2024   General Health   How would you rate your overall physical health? Good   Feel stress (tense, anxious, or unable to sleep) Not at all            9/13/2024   Nutrition   Three or more servings of calcium each day? Yes   Diet: Regular (no restrictions)   How many servings of fruit and vegetables per day? (!) 2-3   How many sweetened beverages each day? 0-1            9/13/2024   Exercise   Days per week of moderate/strenous exercise 3 days   Average minutes spent exercising at this level 30 min            9/13/2024   Social Factors   Frequency of gathering with friends or relatives Three times a week   Worry food won't last until get money to buy more No   Food not last or not have enough money for food? No   Do you have housing? (Housing is defined as stable permanent housing and does not include staying ouside in a car, in a tent, in an abandoned building, in an overnight shelter, or couch-surfing.) Yes   Are you worried about losing your housing? No   Lack of transportation? No   Unable to get utilities (heat,electricity)? No            9/13/2024   Fall Risk   Fallen 2 or more times in the past year? No   Trouble with walking or balance? No             9/13/2024   Dental   Dentist two times every year? Yes            9/13/2024   TB Screening   Were you born outside of the [place]? No            Today's PHQ-2 Score:       9/16/2024     5:41 PM   PHQ-2 ( 1999 [employer])   Q1: Little interest or pleasure in doing things 0   Q2: Feeling down, depressed or hopeless 0   PHQ-2 Score 0   Q1: Little interest or pleasure in doing things Not at all   Q2: Feeling down, depressed or hopeless Not at all   PHQ-2 Score 0           9/13/2024   Substance Use   Alcohol  more than 3/day or more than 7/wk Yes   How often do you have a drink containing alcohol 2 to 3 times a week   How many alcohol drinks on typical day 3 or 4   How often do you have 5+ drinks at one occasion Monthly   Audit 2/3 Score 3   How often not able to stop drinking once started Never   How often failed to do what normally expected Never   How often needed first drink in am after a heavy drinking session Never   How often feeling of guilt or remorse after drinking Never   How often unable to remember what happened the night before Never   Have you or someone else been injured because of your drinking No   Has anyone been concerned or suggested you cut down on drinking No   TOTAL SCORE - AUDIT 6   Do you use any other substances recreationally? (!) CANNABIS PRODUCTS        Social History     Tobacco Use    Smoking status: Former     Current packs/day: 0.00     Types: Cigarettes     Quit date: 1990     Years since quittin.7     Passive exposure: Past    Smokeless tobacco: Former    Tobacco comments:     occasional cigar smoker   Vaping Use    Vaping status: Never Used   Substance Use Topics    Alcohol use: Yes     Alcohol/week: 4.0 standard drinks of alcohol     Comment: Alcoholic Drinks/day: weekly    Drug use: No     Comment: Drug use: Occasional use.             2024   One time HIV Screening   Previous HIV test? I don't know          2024   STI Screening   New sexual partner(s) since last STI/HIV test? No      Last PSA:   Prostate Specific Antigen Screen   Date Value Ref Range Status   2023 1.13 0.00 - 4.50 ng/mL Final   2022 1.32 0.00 - 4.50 ug/L Final     ASCVD Risk   The 10-year ASCVD risk score (Marguerite OTERO, et al., 2019) is: 10.2%    Values used to calculate the score:      Age: 63 years      Sex: Male      Is Non- : No      Diabetic: No      Tobacco smoker: No      Systolic Blood Pressure: 128 mmHg      Is BP treated: No      HDL Cholesterol:  "46 mg/dL      Total Cholesterol: 152 mg/dL           Reviewed and updated as needed this visit by Provider                             Objective    Exam  /72 (BP Location: Right arm, Patient Position: Sitting)   Pulse 70   Temp 97.9  F (36.6  C)   Resp 16   Ht 1.791 m (5' 10.5\")   Wt 98.9 kg (218 lb)   SpO2 97%   BMI 30.84 kg/m     Estimated body mass index is 30.84 kg/m  as calculated from the following:    Height as of this encounter: 1.791 m (5' 10.5\").    Weight as of this encounter: 98.9 kg (218 lb).    Physical Exam  GENERAL: alert and no distress  NECK: no adenopathy, no asymmetry, masses, or scars  RESP: lungs clear to auscultation - no rales, rhonchi or wheezes  CV: regular rate and rhythm, normal S1 S2, no S3 or S4, no murmur, click or rub, no peripheral edema  ABDOMEN: soft, nontender, no hepatosplenomegaly, no masses and bowel sounds normal  MS: no gross musculoskeletal defects noted, no edema        Signed Electronically by: Darius Anderson, CNP    "

## 2024-09-18 ENCOUNTER — TELEPHONE (OUTPATIENT)
Dept: INTERNAL MEDICINE | Facility: CLINIC | Age: 64
End: 2024-09-18
Payer: COMMERCIAL

## 2024-09-18 DIAGNOSIS — E11.9 TYPE 2 DIABETES MELLITUS WITHOUT COMPLICATION, WITHOUT LONG-TERM CURRENT USE OF INSULIN (H): Primary | ICD-10-CM

## 2025-01-04 ENCOUNTER — HEALTH MAINTENANCE LETTER (OUTPATIENT)
Age: 65
End: 2025-01-04

## 2025-03-03 ENCOUNTER — NURSE TRIAGE (OUTPATIENT)
Dept: INTERNAL MEDICINE | Facility: CLINIC | Age: 65
End: 2025-03-03
Payer: COMMERCIAL

## 2025-03-03 NOTE — TELEPHONE ENCOUNTER
Nurse Triage SBAR    Is this a 2nd Level Triage? NO    Situation:   Patient notes swelling, discoloration and pain around finger.    Background:   Patient notes the finger was still yesterday 3/2/25 but was more swollen and discolored after waking up today.    Assessment:   Patient reports the right hand ring finger is swollen, purple/red in color and has 2/10 pain with some tingling at the end.     Protocol Recommended Disposition:   Home Care    Recommendation:   Gave care advice, patient agreeable to care advice. Recommended if symptoms persist to follow up with orthopedic urgent care, patient agreeable to this. No further questions or concerns at this time.    Does the patient meet one of the following criteria for ADS visit consideration? 16+ years old, with an [facility] PCP     TIP  Providers, please consider if this condition is appropriate for management at one of our Acute and Diagnostic Services sites.     If patient is a good candidate, please use dotphrase <dot>triageresponse and select Refer to ADS to document.    Reason for Disposition   Finger pain    Additional Information   Negative: Followed an injury   Negative: Wound looks infected (e.g., spreading redness, pus)   Negative: Caused by an animal bite   Negative: Caused by frostbite   Negative: Hand or wrist pain is main symptom   Negative: Looks infected (spreading redness, red streak, pus) and severe pain with movement   Negative: Patient sounds very sick or weak to the triager   Negative: SEVERE pain (e.g., excruciating, unable to use hand at all)   Negative: Looks infected (e.g., spreading redness, red streak, pus)   Negative: Yellow pus under skin around fingernail (cuticle) or pus under fingernail   Negative: Painful blisters on fingertip   Negative: Numbness (i.e., loss of sensation) in hand or fingers of new-onset   Negative: MODERATE pain (e.g., interferes with normal activities) and present > 3 days   Negative: Swollen joint with no fever or  "redness   Negative: Redness and painful skin around fingernail (cuticle, nailfold)   Negative: Neck pain and pain shoots into fingers   Negative: Patient wants to be seen   Negative: MILD pain (e.g., does not interfere with normal activities) and present > 7 days   Negative: Finger pain is a chronic symptom (recurrent or ongoing AND present > 4 weeks)   Negative: Morning stiffness of fingers is a chronic symptom (recurrent or ongoing AND present > 4 weeks)   Negative: Finger locking (gets stuck in one position) is a chronic symptom (recurrent or ongoing AND present > 4 weeks)   Negative: Finger pain or color changes occurs after exposure to cold and is a recurrent problem   Negative: Numbness or tingling is a chronic symptom (recurrent or ongoing AND present > 4 weeks)   Negative: Pain is worsened by using computer keyboard or mouse    Answer Assessment - Initial Assessment Questions  1. ONSET: \"When did the pain start?\"       Swelling started Sunday morning 3/2/25    2. LOCATION and RADIATION: \"Where is the pain located?\"  (e.g., fingertip, around nail, joint, entire      Right ring finger. Pain is on the finger tip and in the joint    3. SEVERITY: \"How bad is the pain?\" \"What does it keep you from doing?\"   (Scale 1-10; or mild, moderate, severe)      2/10    4. APPEARANCE: \"What does the finger look like?\" (e.g., redness, swelling, bruising, pallor)      Swelling, redness, bruising/purple, no pallor    5. WORK OR EXERCISE: \"Has there been any recent work or exercise that involved this part (i.e., fingers or hand) of the body?\"      Nothing out of the ordinary     6. CAUSE: \"What do you think is causing the pain?\"      Not sure    7. AGGRAVATING FACTORS: \"What makes the pain worse?\" (e.g., using computer)      Shaking hands bothered it, squeezing    8. OTHER SYMPTOMS: \"Do you have any other symptoms?\" (e.g., fever, neck pain, numbness)      Shoulder issues/stiffness in neck (not more than normal) Tingling    9. " "PREGNANCY: \"Is there any chance you are pregnant?\" \"When was your last menstrual period?\"      N/A    Protocols used: Finger Pain-A-[place]    "

## 2025-04-01 ENCOUNTER — TRANSFERRED RECORDS (OUTPATIENT)
Dept: MULTI SPECIALTY CLINIC | Facility: CLINIC | Age: 65
End: 2025-04-01

## 2025-04-01 LAB — RETINOPATHY: NORMAL

## 2025-04-19 ENCOUNTER — HEALTH MAINTENANCE LETTER (OUTPATIENT)
Age: 65
End: 2025-04-19

## 2025-05-05 ENCOUNTER — ANCILLARY PROCEDURE (OUTPATIENT)
Dept: GENERAL RADIOLOGY | Facility: CLINIC | Age: 65
End: 2025-05-05
Attending: NURSE PRACTITIONER
Payer: COMMERCIAL

## 2025-05-05 ENCOUNTER — OFFICE VISIT (OUTPATIENT)
Dept: INTERNAL MEDICINE | Facility: CLINIC | Age: 65
End: 2025-05-05
Payer: COMMERCIAL

## 2025-05-05 VITALS
OXYGEN SATURATION: 96 % | HEART RATE: 64 BPM | SYSTOLIC BLOOD PRESSURE: 130 MMHG | RESPIRATION RATE: 16 BRPM | DIASTOLIC BLOOD PRESSURE: 74 MMHG | HEIGHT: 71 IN | WEIGHT: 211 LBS | BODY MASS INDEX: 29.54 KG/M2 | TEMPERATURE: 98.3 F

## 2025-05-05 DIAGNOSIS — E11.9 TYPE 2 DIABETES MELLITUS WITHOUT COMPLICATION, WITH LONG-TERM CURRENT USE OF INSULIN (H): Primary | ICD-10-CM

## 2025-05-05 DIAGNOSIS — Z79.4 TYPE 2 DIABETES MELLITUS WITHOUT COMPLICATION, WITH LONG-TERM CURRENT USE OF INSULIN (H): Primary | ICD-10-CM

## 2025-05-05 DIAGNOSIS — G47.33 OSA ON CPAP: ICD-10-CM

## 2025-05-05 DIAGNOSIS — E78.00 HYPERCHOLESTEROLEMIA: ICD-10-CM

## 2025-05-05 DIAGNOSIS — R07.81 RIB PAIN ON RIGHT SIDE: ICD-10-CM

## 2025-05-05 DIAGNOSIS — K51.90 ULCERATIVE COLITIS WITHOUT COMPLICATIONS, UNSPECIFIED LOCATION (H): ICD-10-CM

## 2025-05-05 DIAGNOSIS — S29.012A STRAIN OF RIGHT LATISSIMUS DORSI MUSCLE: ICD-10-CM

## 2025-05-05 DIAGNOSIS — E78.00 PURE HYPERCHOLESTEROLEMIA: ICD-10-CM

## 2025-05-05 PROBLEM — D75.1 ERYTHROCYTOSIS: Status: ACTIVE | Noted: 2025-05-05

## 2025-05-05 PROBLEM — K63.5 POLYP OF COLON: Status: ACTIVE | Noted: 2023-09-01

## 2025-05-05 PROBLEM — H35.60 RETINAL HEMORRHAGE: Status: ACTIVE | Noted: 2025-05-05

## 2025-05-05 LAB
CHOLEST SERPL-MCNC: 231 MG/DL
CREAT UR-MCNC: 239 MG/DL
EST. AVERAGE GLUCOSE BLD GHB EST-MCNC: 143 MG/DL
FASTING STATUS PATIENT QL REPORTED: YES
HBA1C MFR BLD: 6.6 % (ref 0–5.6)
HDLC SERPL-MCNC: 45 MG/DL
LDLC SERPL CALC-MCNC: 150 MG/DL
MICROALBUMIN UR-MCNC: <12 MG/L
MICROALBUMIN/CREAT UR: NORMAL MG/G{CREAT}
NONHDLC SERPL-MCNC: 186 MG/DL
TRIGL SERPL-MCNC: 178 MG/DL

## 2025-05-05 PROCEDURE — 99214 OFFICE O/P EST MOD 30 MIN: CPT | Performed by: NURSE PRACTITIONER

## 2025-05-05 PROCEDURE — 83036 HEMOGLOBIN GLYCOSYLATED A1C: CPT | Performed by: NURSE PRACTITIONER

## 2025-05-05 PROCEDURE — 36415 COLL VENOUS BLD VENIPUNCTURE: CPT | Performed by: NURSE PRACTITIONER

## 2025-05-05 PROCEDURE — 82570 ASSAY OF URINE CREATININE: CPT | Performed by: NURSE PRACTITIONER

## 2025-05-05 PROCEDURE — 3078F DIAST BP <80 MM HG: CPT | Performed by: NURSE PRACTITIONER

## 2025-05-05 PROCEDURE — 82043 UR ALBUMIN QUANTITATIVE: CPT | Performed by: NURSE PRACTITIONER

## 2025-05-05 PROCEDURE — 80061 LIPID PANEL: CPT | Performed by: NURSE PRACTITIONER

## 2025-05-05 PROCEDURE — G2211 COMPLEX E/M VISIT ADD ON: HCPCS | Performed by: NURSE PRACTITIONER

## 2025-05-05 PROCEDURE — 3075F SYST BP GE 130 - 139MM HG: CPT | Performed by: NURSE PRACTITIONER

## 2025-05-05 PROCEDURE — 71101 X-RAY EXAM UNILAT RIBS/CHEST: CPT | Mod: TC | Performed by: RADIOLOGY

## 2025-05-05 RX ORDER — SIMVASTATIN 20 MG
TABLET ORAL
Qty: 90 TABLET | Refills: 3 | Status: SHIPPED | OUTPATIENT
Start: 2025-05-05

## 2025-05-05 RX ORDER — DOXYCYCLINE 100 MG/1
100 CAPSULE ORAL 2 TIMES DAILY
COMMUNITY
Start: 2025-04-23

## 2025-05-05 NOTE — PROGRESS NOTES
Assessment & Plan     Type 2 diabetes mellitus without complication, with long-term current use of insulin (H)  We checked a hemoglobin A1c in September 2024, that came back at 6.9%.    He tried Ozempic for a bit but then he tried Ozempic for a bit but then discontinued that medication and switched to diet and exercise changes.    He has lost some weight, down to 211 pounds.  He was his light is 202 pounds in 2021.      - HEMOGLOBIN A1C; Future  - Albumin Random Urine Quantitative with Creat Ratio; Future  - Adult Eye  Referral; Future    JESSIE on CPAP  Compliant with CPAP    Ulcerative colitis without complications, unspecified location (H)  No longer using mesalamine, overdue for follow-up with GI doctor    Hypercholesterolemia  Unfortunately discontinued his simvastatin many months ago but is open to restarting this.  Recheck fasting cholesterol labs.    We discussed ACC/AHA guidelines suggesting empirical use of statin cholesterol drugs and type 2 diabetes  - Lipid Profile (Chol, Trig, HDL, LDL calc); Future    Rib pain on right side      - XR Ribs & Chest Right G/E 3 Views; Future    Strain of right latissimus dorsi muscle  He has been experiencing some right posterior upper flank tenderness consistent with latissimus dorsi strain.  Advised he restart his home exercise routine and consider PT consult if pain continues.    We did discuss getting some x-rays today, in an abundance of caution.      Patient Instructions   I suspect that the pain on your right side is due to a latissimus dorsi strain.  This should get better over time but could take a a while.    I would advise restarting some of the exercises you had been doing in the past to strengthen this muscle.  You could ask your physical therapist for some specific exercises to do at home.    You are due to recheck some lab work today.    Given your history of type 2 diabetes, it is recommended that you use a statin cholesterol drug to lower your  "risk of heart attack and stroke.  It would probably be a good idea to restart your simvastatin at some point.    Make sure you are up to speed on your eye exams.    Blood work today, results will come through [facility]      The longitudinal plan of care for the diagnosis(es)/condition(s) as documented were addressed during this visit. Due to the added complexity in care, I will continue to support Joey in the subsequent management and with ongoing continuity of care.      BMI  Estimated body mass index is 29.85 kg/m  as calculated from the following:    Height as of this encounter: 1.791 m (5' 10.5\").    Weight as of this encounter: 95.7 kg (211 lb).             Subjective   Joey is a 64 year old, presenting for the following health issues:  Rib Pain (Mid back and right rib cage pain, hurts to swing golf club)      5/5/2025     7:29 AM   Additional Questions   Roomed by Althea COTE     History of Present Illness       Reason for visit:  Rib Pain  Symptom onset:  More than a month  Symptoms include:  Pain in my side  Symptom progression:  Staying the same  Had these symptoms before:  Yes  Has tried/received treatment for these symptoms:  Yes  Previous treatment was successful:  No  What makes it worse:  Golfing  What makes it better:  Not golfing   He is taking medications regularly.                      Objective    /74 (BP Location: Right arm, Patient Position: Sitting, Cuff Size: Adult Regular)   Pulse 64   Temp 98.3  F (36.8  C)   Resp 16   Ht 1.791 m (5' 10.5\")   Wt 95.7 kg (211 lb)   SpO2 96%   BMI 29.85 kg/m    Body mass index is 29.85 kg/m .  Physical Exam   Pain with palpation to the right latissimus dorsi musculature              Signed Electronically by: Darius Anderson, CNP    "

## 2025-05-05 NOTE — PATIENT INSTRUCTIONS
I suspect that the pain on your right side is due to a latissimus dorsi strain.  This should get better over time but could take a a while.    I would advise restarting some of the exercises you had been doing in the past to strengthen this muscle.  You could ask your physical therapist for some specific exercises to do at home.    You are due to recheck some lab work today.    Given your history of type 2 diabetes, it is recommended that you use a statin cholesterol drug to lower your risk of heart attack and stroke.  It would probably be a good idea to restart your simvastatin at some point.    Make sure you are up to speed on your eye exams.    Blood work today, results will come through [employer]

## 2025-05-06 ENCOUNTER — PATIENT OUTREACH (OUTPATIENT)
Dept: CARE COORDINATION | Facility: CLINIC | Age: 65
End: 2025-05-06
Payer: COMMERCIAL

## 2025-05-08 ENCOUNTER — PATIENT OUTREACH (OUTPATIENT)
Dept: CARE COORDINATION | Facility: CLINIC | Age: 65
End: 2025-05-08
Payer: COMMERCIAL

## 2025-06-16 ENCOUNTER — OFFICE VISIT (OUTPATIENT)
Dept: INTERNAL MEDICINE | Facility: CLINIC | Age: 65
End: 2025-06-16
Payer: COMMERCIAL

## 2025-06-16 VITALS
SYSTOLIC BLOOD PRESSURE: 126 MMHG | OXYGEN SATURATION: 39 % | WEIGHT: 214 LBS | DIASTOLIC BLOOD PRESSURE: 72 MMHG | HEART RATE: 68 BPM | RESPIRATION RATE: 16 BRPM | BODY MASS INDEX: 29.96 KG/M2 | HEIGHT: 71 IN | TEMPERATURE: 98.3 F

## 2025-06-16 DIAGNOSIS — E78.00 PURE HYPERCHOLESTEROLEMIA: ICD-10-CM

## 2025-06-16 DIAGNOSIS — G47.33 OBSTRUCTIVE SLEEP APNEA SYNDROME: Primary | ICD-10-CM

## 2025-06-16 DIAGNOSIS — C85.80 MARGINAL ZONE LYMPHOMA (H): ICD-10-CM

## 2025-06-16 DIAGNOSIS — K50.10 CROHN'S DISEASE OF LARGE INTESTINE WITHOUT COMPLICATION (H): ICD-10-CM

## 2025-06-16 LAB
CHOLEST SERPL-MCNC: 157 MG/DL
FASTING STATUS PATIENT QL REPORTED: YES
HDLC SERPL-MCNC: 46 MG/DL
LDLC SERPL CALC-MCNC: 88 MG/DL
NONHDLC SERPL-MCNC: 111 MG/DL
TRIGL SERPL-MCNC: 113 MG/DL

## 2025-06-16 PROCEDURE — G2211 COMPLEX E/M VISIT ADD ON: HCPCS | Performed by: NURSE PRACTITIONER

## 2025-06-16 PROCEDURE — 99214 OFFICE O/P EST MOD 30 MIN: CPT | Performed by: NURSE PRACTITIONER

## 2025-06-16 PROCEDURE — 36415 COLL VENOUS BLD VENIPUNCTURE: CPT | Performed by: NURSE PRACTITIONER

## 2025-06-16 PROCEDURE — 3074F SYST BP LT 130 MM HG: CPT | Performed by: NURSE PRACTITIONER

## 2025-06-16 PROCEDURE — 80061 LIPID PANEL: CPT | Performed by: NURSE PRACTITIONER

## 2025-06-16 PROCEDURE — 3078F DIAST BP <80 MM HG: CPT | Performed by: NURSE PRACTITIONER

## 2025-06-16 NOTE — PROGRESS NOTES
"  Assessment & Plan     Obstructive sleep apnea syndrome  His machine is broken.  He needs a new machine.    He is faithful with his CPAP machine when it is working.    He took his machine to his supplier and was told that he needed a new prescription.    He had a sleep study in circa 2016 which revealed severe sleep apnea with AHI greater than 40.    Historically has used straight CPAP settings (not AutoPap) at 8 cm water    - CPAP Order for DME - ONLY FOR DME    Hypercholesterolemia  He had stopped his simvastatin for a while now back on it.  Would like to recheck his labs  - Lipid panel reflex to direct LDL Fasting; Future    Marginal zone lymphoma (H)  He sees [facility] on a regular basis, in remission    Crohn's disease of large intestine without complication (H)  Had been on mesalamine, no longer using.  Has improved his diet with more plant-based foods and has really cut down on alcohol which has cut down on the blood in the stool but admits he needs to get back in with his gastroenterology for surveillance purposes    Patient Instructions   I gave you the paper prescription for the CPAP machine and supplies.    I did my best to fill it out completely, based on prior settings and information we have available to us.    If the prescription is insufficient, we will have to refer you back to sleep medicine.    Recheck cholesterol labs today.    Make sure that you call and schedule a follow-up appointment with your gastroenterology team.    Continue to work on weight loss and minimizing alcohol      The longitudinal plan of care for the diagnosis(es)/condition(s) as documented were addressed during this visit. Due to the added complexity in care, I will continue to support Joey in the subsequent management and with ongoing continuity of care.            BMI  Estimated body mass index is 30.27 kg/m  as calculated from the following:    Height as of this encounter: 1.791 m (5' 10.5\").    Weight as of this " "encounter: 97.1 kg (214 lb).             Subjective   Joey is a 64 year old, presenting for the following health issues:  Sleep Apnea (Needs CPAP rx, needs new machine)      6/16/2025     8:41 AM   Additional Questions   Roomed by Althea COTE     Via the Health Maintenance questionnaire, the patient has reported the following services have been completed -Eye Exam: [facility] [place] 2025-04-01, this information has been sent to the abstraction team.  History of Present Illness       Reason for visit:  Update C-Pap RXHe consumes 3 sweetened beverage(s) daily.He exercises with enough effort to increase his heart rate 20 to 29 minutes per day.  He exercises with enough effort to increase his heart rate 4 days per week.   He is taking medications regularly.      Here because he needs a new CPAP machine.  He tells me that his machine is broken.  He went to his medical supplier and was told that he needed a new prescription.    Is faithful to his CPAP and uses this nightly.    Last sleep study that I have is from 2016 revealing severe sleep apnea, AHI greater than 40      Objective    /72 (BP Location: Right arm, Patient Position: Sitting, Cuff Size: Adult Regular)   Pulse 68   Temp 98.3  F (36.8  C)   Resp 16   Ht 1.791 m (5' 10.5\")   Wt 97.1 kg (214 lb)   SpO2 (!) 39%   BMI 30.27 kg/m    Body mass index is 30.27 kg/m .  Physical Exam   Patient is healthy appearing and in no acute distress              Signed Electronically by: Darius Anderson, ANDERS    "

## 2025-06-16 NOTE — PATIENT INSTRUCTIONS
I gave you the paper prescription for the CPAP machine and supplies.    I did my best to fill it out completely, based on prior settings and information we have available to us.    If the prescription is insufficient, we will have to refer you back to sleep medicine.    Recheck cholesterol labs today.    Make sure that you call and schedule a follow-up appointment with your gastroenterology team.    Continue to work on weight loss and minimizing alcohol

## 2025-06-17 ENCOUNTER — RESULTS FOLLOW-UP (OUTPATIENT)
Dept: INTERNAL MEDICINE | Facility: CLINIC | Age: 65
End: 2025-06-17

## 2025-06-24 ENCOUNTER — DOCUMENTATION ONLY (OUTPATIENT)
Dept: SLEEP MEDICINE | Facility: CLINIC | Age: 65
End: 2025-06-24
Payer: COMMERCIAL

## 2025-06-24 DIAGNOSIS — G47.33 OSA (OBSTRUCTIVE SLEEP APNEA): Primary | ICD-10-CM

## 2025-06-24 NOTE — PROGRESS NOTES
Patient was offered choice of vendor and chose [facility].  Patient Rik Zarate was set up at [place] on June 24, 2025. Patient received a Resmed Airsense 10 Pressures were set at 8 cm H2O.   Patient s ramp is 5 cm H2O for Off and FLEX/EPR is EPR, 2.  Patient received a Resmed Mask name: NO MASK GIVEN  Pillow mask size Standard, heated tubing and heated humidifier.  Patient has the following compliance requirements: none.    PATIENT PURCHASE AN ADDITIONAL AIRSENSE 10 SN: [id number] DN: [id number]. PT IS USING ONE MACHINE SUN - WED AND THE OTHER MACHINE THE REMAINING DAYS. BOTH MACHINES ARE IN [employer].    Uri Unger

## 2025-07-10 ENCOUNTER — TRANSFERRED RECORDS (OUTPATIENT)
Dept: HEALTH INFORMATION MANAGEMENT | Facility: CLINIC | Age: 65
End: 2025-07-10
Payer: COMMERCIAL

## 2025-08-11 ENCOUNTER — TRANSFERRED RECORDS (OUTPATIENT)
Dept: HEALTH INFORMATION MANAGEMENT | Facility: CLINIC | Age: 65
End: 2025-08-11
Payer: COMMERCIAL

## 2025-08-18 ENCOUNTER — PATIENT OUTREACH (OUTPATIENT)
Dept: CARE COORDINATION | Facility: CLINIC | Age: 65
End: 2025-08-18
Payer: COMMERCIAL

## 2025-08-23 ENCOUNTER — HEALTH MAINTENANCE LETTER (OUTPATIENT)
Age: 65
End: 2025-08-23

## 2025-09-01 ENCOUNTER — PATIENT OUTREACH (OUTPATIENT)
Dept: CARE COORDINATION | Facility: CLINIC | Age: 65
End: 2025-09-01
Payer: COMMERCIAL